# Patient Record
Sex: MALE | Race: WHITE | Employment: FULL TIME | URBAN - METROPOLITAN AREA
[De-identification: names, ages, dates, MRNs, and addresses within clinical notes are randomized per-mention and may not be internally consistent; named-entity substitution may affect disease eponyms.]

---

## 2017-10-23 ENCOUNTER — OFFICE VISIT (OUTPATIENT)
Dept: OTHER | Facility: HOSPITAL | Age: 32
End: 2017-10-23
Attending: PREVENTIVE MEDICINE

## 2017-10-23 ENCOUNTER — HOSPITAL ENCOUNTER (OUTPATIENT)
Dept: GENERAL RADIOLOGY | Facility: HOSPITAL | Age: 32
Discharge: HOME | End: 2017-10-23
Attending: PREVENTIVE MEDICINE

## 2017-10-23 DIAGNOSIS — Z00.00 ANNUAL PHYSICAL EXAM: ICD-10-CM

## 2017-10-23 DIAGNOSIS — Z00.00 ANNUAL PHYSICAL EXAM: Primary | ICD-10-CM

## 2017-10-23 PROCEDURE — 81003 URINALYSIS AUTO W/O SCOPE: CPT

## 2017-10-23 PROCEDURE — 85025 COMPLETE CBC W/AUTO DIFF WBC: CPT

## 2017-10-23 PROCEDURE — 80053 COMPREHEN METABOLIC PANEL: CPT

## 2017-10-23 PROCEDURE — 80061 LIPID PANEL: CPT

## 2018-07-25 ENCOUNTER — OFFICE VISIT (OUTPATIENT)
Dept: OTHER | Facility: HOSPITAL | Age: 33
End: 2018-07-25
Attending: PREVENTIVE MEDICINE
Payer: OTHER MISCELLANEOUS

## 2018-07-25 DIAGNOSIS — M79.604 LUMBAR PAIN WITH RADIATION DOWN RIGHT LEG: Primary | ICD-10-CM

## 2018-07-25 DIAGNOSIS — M54.50 LUMBAR PAIN WITH RADIATION DOWN RIGHT LEG: Primary | ICD-10-CM

## 2018-07-25 RX ORDER — CYCLOBENZAPRINE HCL 10 MG
TABLET ORAL
Qty: 10 TABLET | Refills: 0 | Status: SHIPPED | OUTPATIENT
Start: 2018-07-25 | End: 2021-02-17

## 2018-07-30 ENCOUNTER — APPOINTMENT (OUTPATIENT)
Dept: OTHER | Facility: HOSPITAL | Age: 33
End: 2018-07-30
Attending: PREVENTIVE MEDICINE
Payer: OTHER MISCELLANEOUS

## 2018-08-08 ENCOUNTER — OFFICE VISIT (OUTPATIENT)
Dept: OTHER | Facility: HOSPITAL | Age: 33
End: 2018-08-08
Attending: PREVENTIVE MEDICINE

## 2018-08-08 DIAGNOSIS — M54.50 LUMBAR PAIN: Primary | ICD-10-CM

## 2018-08-08 RX ORDER — CYCLOBENZAPRINE HCL 10 MG
TABLET ORAL
Qty: 10 TABLET | Refills: 0 | Status: SHIPPED | OUTPATIENT
Start: 2018-08-08 | End: 2021-02-17

## 2018-08-13 ENCOUNTER — APPOINTMENT (OUTPATIENT)
Dept: PHYSICAL THERAPY | Facility: HOSPITAL | Age: 33
End: 2018-08-13
Attending: PREVENTIVE MEDICINE
Payer: OTHER MISCELLANEOUS

## 2018-08-15 ENCOUNTER — HOSPITAL ENCOUNTER (OUTPATIENT)
Dept: PHYSICAL THERAPY | Facility: HOSPITAL | Age: 33
Setting detail: THERAPIES SERIES
Discharge: HOME | End: 2018-08-15
Attending: PREVENTIVE MEDICINE
Payer: OTHER MISCELLANEOUS

## 2018-08-15 PROCEDURE — 97110 THERAPEUTIC EXERCISES: CPT

## 2018-08-15 PROCEDURE — 97162 PT EVAL MOD COMPLEX 30 MIN: CPT

## 2018-08-15 NOTE — PROGRESS NOTES
SPINE EVALUATION:   Referring Physician: Dr. Jen Jordan  Diagnosis: Lumbar pain strain.      Date of Service: 8/15/2018     PATIENT SUMMARY   Mikayla Nelson is a 34year old y/o male who presents to therapy today with complaints of noting the onset None  OBJECTIVE:   Observation/Posture: tends to hyperextend his knees when standing, elevated right iliac crest, decreased lumbar lordosis. Sit to/from standing:  Without UE assist, but notes 1-2/10 low back pain.    Gait: decreased stride lengths, toes UE assist and without pain. Pt will be able to walk at least 4 blocks without back pain. Pt will report being able to stand for at least 30 minutes without pain with his ADLs, etc...   Pt will demonstrated neutral spine posture with bending and when lift

## 2018-08-20 ENCOUNTER — HOSPITAL ENCOUNTER (OUTPATIENT)
Dept: PHYSICAL THERAPY | Facility: HOSPITAL | Age: 33
Setting detail: THERAPIES SERIES
Discharge: HOME | End: 2018-08-20
Attending: PREVENTIVE MEDICINE
Payer: OTHER MISCELLANEOUS

## 2018-08-20 PROCEDURE — 97140 MANUAL THERAPY 1/> REGIONS: CPT

## 2018-08-20 PROCEDURE — 97110 THERAPEUTIC EXERCISES: CPT

## 2018-08-20 NOTE — PROGRESS NOTES
Dx: Lumbar pain strain. Authorized # of Visits:  6         Next MD visit: shelbi. .. Fall Risk: standard         Precautions: n/a             Subjective:  Patient rates mildly sharp right low back pain 1/10 this morning.     Denies any LE radicular sy repsl..       Sit on SB trunk rotation 15# x 10 to the left and to the right. Standing biceps 9# x 10 B. .. Standing PUP with a SB on the wall x 20 reps. .. Skilled Services: education, manual therapy, appropriate exercise progression.

## 2018-08-22 ENCOUNTER — APPOINTMENT (OUTPATIENT)
Dept: OTHER | Facility: HOSPITAL | Age: 33
End: 2018-08-22
Attending: PREVENTIVE MEDICINE
Payer: OTHER MISCELLANEOUS

## 2018-08-22 ENCOUNTER — HOSPITAL ENCOUNTER (OUTPATIENT)
Dept: PHYSICAL THERAPY | Facility: HOSPITAL | Age: 33
Setting detail: THERAPIES SERIES
Discharge: HOME | End: 2018-08-22
Attending: PREVENTIVE MEDICINE
Payer: OTHER MISCELLANEOUS

## 2018-08-22 PROCEDURE — 97110 THERAPEUTIC EXERCISES: CPT

## 2018-08-22 PROCEDURE — 97140 MANUAL THERAPY 1/> REGIONS: CPT

## 2018-08-22 NOTE — PROGRESS NOTES
Dx: Lumbar pain strain. Authorized # of Visits:  6         Next MD visit: shelbi. .. Fall Risk: standard         Precautions: n/a             Subjective:  Patient rates right-sided low back pain 0/10 when sitting.     Patient rates mildly sharp right Date:               TX#: 5/ Date:               TX#: 6/   TM at 2.4 mph for 6 minutes. TM 2.7 mph for 6 minutes. Supine hip PROM by PT. Supine hip PROM by PT. Prone hip IR grade 3 mobs by PT each leg.   Grade 3 prone hip IR mobs left and righ

## 2018-08-29 ENCOUNTER — HOSPITAL ENCOUNTER (OUTPATIENT)
Dept: PHYSICAL THERAPY | Facility: HOSPITAL | Age: 33
Setting detail: THERAPIES SERIES
Discharge: HOME | End: 2018-08-29
Attending: PREVENTIVE MEDICINE
Payer: OTHER MISCELLANEOUS

## 2018-08-29 PROCEDURE — 97140 MANUAL THERAPY 1/> REGIONS: CPT

## 2018-08-29 PROCEDURE — 97110 THERAPEUTIC EXERCISES: CPT

## 2018-08-29 NOTE — PROGRESS NOTES
Dx: Lumbar pain strain. Authorized # of Visits:  6         Next MD visit: shelbi. .. Fall Risk: standard         Precautions: n/a             Subjective:  Patient rates right-sided low back pain 0/10 when sitting.     Patient rates low back pain 0/10 without UE assist and without pain. Pt will be able to walk at least 4 blocks without back pain. Pt will report being able to stand for at least 30 minutes without pain with his ADLs, etc...   Pt will demonstrated neutral spine posture with bending and w with cueing. Sit on SB trunk rotation 15# x 15 to the left and to the right. Side lying thoracic rotation x 12 each side. X 15 reps. [place] Master BOSU step ups 2x10 ea with high knee, minimal UE support     6\" dips x 10 each leg. 6\" dips x 25 each leg.   Stand

## 2018-08-31 ENCOUNTER — HOSPITAL ENCOUNTER (OUTPATIENT)
Dept: PHYSICAL THERAPY | Facility: HOSPITAL | Age: 33
Setting detail: THERAPIES SERIES
Discharge: HOME | End: 2018-08-31
Attending: PREVENTIVE MEDICINE
Payer: OTHER MISCELLANEOUS

## 2018-08-31 PROCEDURE — 97110 THERAPEUTIC EXERCISES: CPT

## 2018-08-31 NOTE — PROGRESS NOTES
Dx: Lumbar pain strain. Authorized # of Visits:  6         Next MD visit: tamrad. .. Fall Risk: standard         Precautions: n/a              Progress Summary    Pt has attended 5, cancelled 0, and no shown 0 visits in Physical Therapy.      Subjecti evaluation): 56/100  FOTO (re-evaluation): 61/100    Goals: To be achieved over 6 PT sessions:    Sit to/from standing without UE assist and without pain. MET  Pt will be able to walk at least 4 blocks without back pain.   PROGRESSING  Pt will report being mobs left and right. Lower lumbar rotation unilateral  to improve flexibility, 2x10 sec Prone press ups 2x10 to improve lumbar mobility    DKTC with SB x 20 reps. . X 20 reps. Deepa Cedillow  LTR without swiss ball x20 Bridging 2 x 15 with calves on SB 3 sec hold    LTR the left and to the right. Hamstring stretching 2x30 sec ea to improve flexibility X    Standing biceps 9# x 10 B. .. Stride standing biceps curls 9# x 10 reps with each foot forward. X X    Standing PUP with a SB on the wall x 20 reps. ..  X 20 reps X X

## 2018-09-04 ENCOUNTER — APPOINTMENT (OUTPATIENT)
Dept: OTHER | Facility: HOSPITAL | Age: 33
End: 2018-09-04
Attending: PREVENTIVE MEDICINE
Payer: OTHER MISCELLANEOUS

## 2018-09-05 ENCOUNTER — HOSPITAL ENCOUNTER (OUTPATIENT)
Dept: PHYSICAL THERAPY | Facility: HOSPITAL | Age: 33
Setting detail: THERAPIES SERIES
Discharge: HOME | End: 2018-09-05
Attending: PREVENTIVE MEDICINE
Payer: OTHER MISCELLANEOUS

## 2018-09-05 PROCEDURE — 97110 THERAPEUTIC EXERCISES: CPT

## 2018-09-05 NOTE — PROGRESS NOTES
Dx: Lumbar pain strain. Authorized # of Visits:  6         Next MD visit: shelbi. .. Fall Risk: standard         Precautions: n/a                Subjective: Patient rates right-sided low back pain 0/10 when sitting.     He notes that he followed up wi grade III to improve mobility Lower lumbar rotation stretching 20x to improve  Side lying lumbar rotation grade 3 for 2-3 minutes. Supine hip PROM by PT. Supine hip PROM by PT. Supine hip PROM by PT.  Single knee to chest 10 sec hold, 5x ea to impr rotation while holding 6# medicine ball, 10x -       Side lying thoracic rotation x 12 each side. X 15 reps. [place] Claude BOSU step ups 2x10 ea with high knee, minimal UE support Reassessment of strength/ROM/FOTO TM 2.8 mph for 5 minutes.         6\" dips x 10 each le

## 2018-09-07 ENCOUNTER — HOSPITAL ENCOUNTER (OUTPATIENT)
Dept: PHYSICAL THERAPY | Facility: HOSPITAL | Age: 33
Setting detail: THERAPIES SERIES
Discharge: HOME | End: 2018-09-07
Attending: PREVENTIVE MEDICINE
Payer: OTHER MISCELLANEOUS

## 2018-09-07 PROCEDURE — 97110 THERAPEUTIC EXERCISES: CPT

## 2018-09-07 NOTE — PROGRESS NOTES
Dx: Lumbar pain strain. Authorized # of Visits:  6         Next MD visit: shelbi. .. Fall Risk: standard         Precautions: n/a                Subjective: feeling Ok, R LB a little achy this morning.  No pain medications    Objective:  See treatment to improve flexibility, 2x10 sec Prone press ups 2x10 to improve lumbar mobility 2 x 10.  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 . X 20 reps. Edna Galea  LTR without swiss ball x20 Bridg left and to the right. Standing on BOSU (flat side) with gentle trunk rotation while holding 6# medicine ball, 10x - --      Side lying thoracic rotation x 12 each side. X 15 reps. Barnetta Netter  BOSU step ups 2x10 ea with high knee, minimal UE support Reassessment of

## 2018-09-10 ENCOUNTER — APPOINTMENT (OUTPATIENT)
Dept: PHYSICAL THERAPY | Facility: HOSPITAL | Age: 33
End: 2018-09-10
Attending: PREVENTIVE MEDICINE
Payer: OTHER MISCELLANEOUS

## 2018-09-12 ENCOUNTER — APPOINTMENT (OUTPATIENT)
Dept: PHYSICAL THERAPY | Facility: HOSPITAL | Age: 33
End: 2018-09-12
Attending: PREVENTIVE MEDICINE
Payer: OTHER MISCELLANEOUS

## 2018-09-12 ENCOUNTER — HOSPITAL ENCOUNTER (OUTPATIENT)
Dept: PHYSICAL THERAPY | Facility: HOSPITAL | Age: 33
Setting detail: THERAPIES SERIES
Discharge: HOME | End: 2018-09-12
Attending: PREVENTIVE MEDICINE
Payer: OTHER MISCELLANEOUS

## 2018-09-12 PROCEDURE — 97110 THERAPEUTIC EXERCISES: CPT

## 2018-09-12 NOTE — PROGRESS NOTES
Dx: Lumbar pain strain. Authorized # of Visits:  6         Next MD visit: shelbi. .. Fall Risk: standard         Precautions: n/a            Discharge Summary    Pt has attended 5 visits in Physical Therapy.      Subjective: feeling Ok, No R LB pain t pain.  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 Pt goals include preventative for further injury.   Past medical history was reviewed with -FABERs and FADIRs tests. 7\" LSU x 5 each leg; low back pain reported with right step-ups, no pain with left step-ups. Balance: SLS R 10 seconds, wobbly with low back pain noted, L 20 seconds no pain.      Today’s Treatment and Response:  Patient Goals: To be achieved over 6 PT sessions:    Sit to/from standing without UE assist and without pain. -MET  Pt will be able to walk at least 4 blocks without back pain. -MET   Pt will report being able to stand for at least 30 minutes without pain wit DKTC with SB x 20 reps. . X 20 reps. Slater August LTR without swiss ball x20 Bridging 2 x 15 with calves on SB 3 sec hold Bridging with calves on SB 3 second holds 2 x 15.    Bridge on SB x 10 w/o UE support  Bridge w/ recip leg raise and neutral pelvis x 10 x 2 w/ f while holding 6# medicine ball, 10x - --      Side lying thoracic rotation x 12 each side. X 15 reps. Eddie DICKSON step ups 2x10 ea with high knee, minimal UE support Reassessment of strength/ROM/FOTO TM 2.8 mph for 5 minutes.   See above      6\" dips x 10 each

## 2018-09-14 ENCOUNTER — APPOINTMENT (OUTPATIENT)
Dept: OTHER | Facility: HOSPITAL | Age: 33
End: 2018-09-14
Attending: FAMILY MEDICINE
Payer: OTHER MISCELLANEOUS

## 2018-10-04 ENCOUNTER — HOSPITAL ENCOUNTER (OUTPATIENT)
Dept: GENERAL RADIOLOGY | Age: 33
Discharge: HOME | End: 2018-10-04
Attending: PHYSICAL MEDICINE & REHABILITATION
Payer: OTHER MISCELLANEOUS

## 2018-10-04 DIAGNOSIS — S39.012A STRAIN OF LUMBAR REGION, INITIAL ENCOUNTER: ICD-10-CM

## 2018-10-04 PROCEDURE — 72100 X-RAY EXAM L-S SPINE 2/3 VWS: CPT | Performed by: PHYSICAL MEDICINE & REHABILITATION

## 2021-02-17 ENCOUNTER — APPOINTMENT (OUTPATIENT)
Dept: OTHER | Facility: HOSPITAL | Age: 36
End: 2021-02-17
Attending: PREVENTIVE MEDICINE

## 2021-02-17 ENCOUNTER — HOSPITAL ENCOUNTER (EMERGENCY)
Facility: HOSPITAL | Age: 36
Discharge: HOME | End: 2021-02-17
Attending: EMERGENCY MEDICINE
Payer: OTHER MISCELLANEOUS

## 2021-02-17 VITALS
WEIGHT: 190 LBS | HEART RATE: 68 BPM | TEMPERATURE: 98 F | DIASTOLIC BLOOD PRESSURE: 75 MMHG | OXYGEN SATURATION: 97 % | HEIGHT: 73 IN | RESPIRATION RATE: 18 BRPM | BODY MASS INDEX: 25.18 KG/M2 | SYSTOLIC BLOOD PRESSURE: 132 MMHG

## 2021-02-17 DIAGNOSIS — T33.90XA FROSTBITE, INITIAL ENCOUNTER: Primary | ICD-10-CM

## 2021-02-17 PROCEDURE — 90471 IMMUNIZATION ADMIN: CPT | Performed by: EMERGENCY MEDICINE

## 2021-02-17 PROCEDURE — 99283 EMERGENCY DEPT VISIT LOW MDM: CPT | Performed by: EMERGENCY MEDICINE

## 2021-02-17 NOTE — ED PROVIDER NOTES
Patient Seen in: [facility] Emergency Department      History   Patient presents with:  Exposure to Cold    Stated Complaint: Frostbite both feet, sent from occupational health. HPI/Subjective:   HPI    The patient presents with frostbite.   The area additionally to the medial aspect of the right great toe about the size of a pencil eraser head, no raised blistering and no drainage, capillary refill less than 2 seconds to the tips of the toes on the right foot.   Minor erythema to the distal aspect

## 2021-02-17 NOTE — ED INITIAL ASSESSMENT (HPI)
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 Yesterday, noted end of some of toes were numb and discolored. Went to occupational health and was told had frostbite and referred here.

## 2023-03-30 ENCOUNTER — OFFICE VISIT (OUTPATIENT)
Dept: OTHER | Facility: HOSPITAL | Age: 38
End: 2023-03-30
Attending: PREVENTIVE MEDICINE

## 2023-03-30 ENCOUNTER — HOSPITAL ENCOUNTER (OUTPATIENT)
Dept: GENERAL RADIOLOGY | Facility: HOSPITAL | Age: 38
Discharge: HOME | End: 2023-03-30
Attending: PREVENTIVE MEDICINE

## 2023-03-30 DIAGNOSIS — Z02.1 PRE-EMPLOYMENT EXAMINATION: ICD-10-CM

## 2023-03-30 DIAGNOSIS — Z02.1 PRE-EMPLOYMENT EXAMINATION: Primary | ICD-10-CM

## 2023-03-30 LAB
ATRIAL RATE: 64 BPM
BASOPHILS # BLD AUTO: 0.03 X10(3) UL (ref 0–0.2)
BASOPHILS NFR BLD AUTO: 0.8 %
BILIRUB UR QL STRIP.AUTO: NEGATIVE
CLARITY UR REFRACT.AUTO: CLEAR
COLOR UR AUTO: YELLOW
EOSINOPHIL # BLD AUTO: 0.06 X10(3) UL (ref 0–0.7)
EOSINOPHIL NFR BLD AUTO: 1.7 %
ERYTHROCYTE [DISTWIDTH] IN BLOOD BY AUTOMATED COUNT: 12.4 %
GLUCOSE UR STRIP.AUTO-MCNC: NEGATIVE MG/DL
HCT VFR BLD AUTO: 41.4 %
HGB BLD-MCNC: 14.9 G/DL
IMM GRANULOCYTES # BLD AUTO: 0.01 X10(3) UL (ref 0–1)
IMM GRANULOCYTES NFR BLD: 0.3 %
KETONES UR STRIP.AUTO-MCNC: NEGATIVE MG/DL
LEUKOCYTE ESTERASE UR QL STRIP.AUTO: NEGATIVE
LYMPHOCYTES # BLD AUTO: 1.33 X10(3) UL (ref 1–4)
LYMPHOCYTES NFR BLD AUTO: 37.3 %
MCH RBC QN AUTO: 31.2 PG (ref 26–34)
MCHC RBC AUTO-ENTMCNC: 36 G/DL (ref 31–37)
MCV RBC AUTO: 86.8 FL
MONOCYTES # BLD AUTO: 0.33 X10(3) UL (ref 0.1–1)
MONOCYTES NFR BLD AUTO: 9.2 %
NEUTROPHILS # BLD AUTO: 1.81 X10 (3) UL (ref 1.5–7.7)
NEUTROPHILS # BLD AUTO: 1.81 X10(3) UL (ref 1.5–7.7)
NEUTROPHILS NFR BLD AUTO: 50.7 %
NITRITE UR QL STRIP.AUTO: NEGATIVE
P AXIS: 19 DEGREES
P-R INTERVAL: 124 MS
PH UR STRIP.AUTO: 6 [PH] (ref 5–8)
PLATELET # BLD AUTO: 161 10(3)UL (ref 150–450)
PROT UR STRIP.AUTO-MCNC: NEGATIVE MG/DL
Q-T INTERVAL: 394 MS
QRS DURATION: 90 MS
QTC CALCULATION (BEZET): 406 MS
R AXIS: 43 DEGREES
RBC # BLD AUTO: 4.77 X10(6)UL
RBC UR QL AUTO: NEGATIVE
SP GR UR STRIP.AUTO: 1.02 (ref 1–1.03)
T AXIS: 44 DEGREES
UROBILINOGEN UR STRIP.AUTO-MCNC: <2 MG/DL
VENTRICULAR RATE: 64 BPM
WBC # BLD AUTO: 3.6 X10(3) UL (ref 4–11)

## 2023-03-30 PROCEDURE — 80053 COMPREHEN METABOLIC PANEL: CPT

## 2023-03-30 PROCEDURE — 85025 COMPLETE CBC W/AUTO DIFF WBC: CPT

## 2023-03-30 PROCEDURE — 93005 ELECTROCARDIOGRAM TRACING: CPT

## 2023-03-30 PROCEDURE — 81003 URINALYSIS AUTO W/O SCOPE: CPT

## 2023-03-30 PROCEDURE — 80061 LIPID PANEL: CPT

## 2023-03-31 LAB
ALBUMIN SERPL-MCNC: 4.3 G/DL (ref 3.4–5)
ALP LIVER SERPL-CCNC: 114 U/L
ALT SERPL-CCNC: 46 U/L
AST SERPL-CCNC: 22 U/L (ref 15–37)
BILIRUB SERPL-MCNC: 0.7 MG/DL (ref 0.1–2)
BUN BLD-MCNC: 21 MG/DL (ref 7–18)
CALCIUM BLD-MCNC: 9.4 MG/DL (ref 8.5–10.1)
CHLORIDE SERPL-SCNC: 111 MMOL/L (ref 98–112)
CHOLEST SERPL-MCNC: 192 MG/DL (ref ?–200)
CO2 SERPL-SCNC: 28 MMOL/L (ref 21–32)
CREAT BLD-MCNC: 0.73 MG/DL
GFR SERPLBLD BASED ON 1.73 SQ M-ARVRAT: 119 ML/MIN/1.73M2 (ref 60–?)
GGT SERPL-CCNC: 37 U/L
GLUCOSE BLD-MCNC: 96 MG/DL (ref 70–99)
HDLC SERPL-MCNC: 51 MG/DL (ref 40–59)
IRON SERPL-MCNC: 94 UG/DL
LDH SERPL L TO P-CCNC: 164 U/L
LDLC SERPL CALC-MCNC: 133 MG/DL (ref ?–100)
MAGNESIUM SERPL-MCNC: 2.2 MG/DL (ref 1.6–2.6)
NONHDLC SERPL-MCNC: 141 MG/DL (ref ?–130)
PHOSPHATE SERPL-MCNC: 2.7 MG/DL (ref 2.5–4.9)
POTASSIUM SERPL-SCNC: 3.8 MMOL/L (ref 3.5–5.1)
PROT SERPL-MCNC: 7.5 G/DL (ref 6.4–8.2)
SODIUM SERPL-SCNC: 142 MMOL/L (ref 136–145)
TRIGL SERPL-MCNC: 41 MG/DL (ref 30–149)
URATE SERPL-MCNC: 4.6 MG/DL
VLDLC SERPL CALC-MCNC: 7 MG/DL (ref 0–30)

## 2023-12-28 ENCOUNTER — HOSPITAL ENCOUNTER (OUTPATIENT)
Dept: GENERAL RADIOLOGY | Facility: HOSPITAL | Age: 38
Discharge: HOME | End: 2023-12-28
Attending: PREVENTIVE MEDICINE

## 2023-12-28 ENCOUNTER — OFFICE VISIT (OUTPATIENT)
Dept: OTHER | Facility: HOSPITAL | Age: 38
End: 2023-12-28
Attending: PREVENTIVE MEDICINE

## 2023-12-28 DIAGNOSIS — S46.911A RIGHT SHOULDER STRAIN: Primary | ICD-10-CM

## 2023-12-28 DIAGNOSIS — S46.911A RIGHT SHOULDER STRAIN: ICD-10-CM

## 2023-12-28 PROCEDURE — 73030 X-RAY EXAM OF SHOULDER: CPT | Performed by: PREVENTIVE MEDICINE

## 2024-01-02 ENCOUNTER — APPOINTMENT (OUTPATIENT)
Dept: OTHER | Facility: HOSPITAL | Age: 39
End: 2024-01-02
Attending: PREVENTIVE MEDICINE

## 2024-01-10 ENCOUNTER — OFFICE VISIT (OUTPATIENT)
Dept: OTHER | Facility: HOSPITAL | Age: 39
End: 2024-01-10
Attending: PREVENTIVE MEDICINE

## 2024-01-10 DIAGNOSIS — M25.511 ACUTE PAIN OF RIGHT SHOULDER: Primary | ICD-10-CM

## 2024-01-19 ENCOUNTER — OFFICE VISIT (OUTPATIENT)
Dept: OTHER | Facility: HOSPITAL | Age: 39
End: 2024-01-19
Attending: PREVENTIVE MEDICINE

## 2024-01-19 DIAGNOSIS — S46.911D STRAIN OF RIGHT SHOULDER, SUBSEQUENT ENCOUNTER: Primary | ICD-10-CM

## 2024-01-22 ENCOUNTER — TELEPHONE (OUTPATIENT)
Dept: PHYSICAL THERAPY | Facility: HOSPITAL | Age: 39
End: 2024-01-22

## 2024-01-23 ENCOUNTER — TELEPHONE (OUTPATIENT)
Dept: PHYSICAL THERAPY | Facility: HOSPITAL | Age: 39
End: 2024-01-23

## 2024-01-24 ENCOUNTER — HOSPITAL ENCOUNTER (OUTPATIENT)
Dept: GENERAL RADIOLOGY | Facility: HOSPITAL | Age: 39
Discharge: HOME | End: 2024-01-24
Attending: PREVENTIVE MEDICINE

## 2024-01-24 ENCOUNTER — OFFICE VISIT (OUTPATIENT)
Dept: OTHER | Facility: HOSPITAL | Age: 39
End: 2024-01-24
Attending: PREVENTIVE MEDICINE

## 2024-01-24 ENCOUNTER — TELEPHONE (OUTPATIENT)
Dept: PHYSICAL THERAPY | Facility: HOSPITAL | Age: 39
End: 2024-01-24

## 2024-01-24 DIAGNOSIS — M54.2 NECK PAIN: Primary | ICD-10-CM

## 2024-01-24 DIAGNOSIS — M54.2 NECK PAIN: ICD-10-CM

## 2024-01-24 PROCEDURE — 72050 X-RAY EXAM NECK SPINE 4/5VWS: CPT | Performed by: PREVENTIVE MEDICINE

## 2024-01-25 ENCOUNTER — OFFICE VISIT (OUTPATIENT)
Dept: PHYSICAL THERAPY | Facility: HOSPITAL | Age: 39
End: 2024-01-25
Attending: PREVENTIVE MEDICINE
Payer: OTHER MISCELLANEOUS

## 2024-01-25 DIAGNOSIS — M54.2 NECK PAIN: Primary | ICD-10-CM

## 2024-01-25 PROCEDURE — 97110 THERAPEUTIC EXERCISES: CPT

## 2024-01-25 PROCEDURE — 97140 MANUAL THERAPY 1/> REGIONS: CPT

## 2024-01-25 PROCEDURE — 97161 PT EVAL LOW COMPLEX 20 MIN: CPT

## 2024-01-25 NOTE — PROGRESS NOTES
SPINE EVALUATION:     Diagnosis:   Neck pain (M54.2)   Strain of right shoulder, subsequent encounter (S40.860D)          Comments:  Neck pain with shoulder pain - 2-3 times per week for 2 weeks            Referring Provider: Ac Horn  Date of Evaluation:    1/25/2024    Precautions:  None Next MD visit:   none scheduled  Date of Surgery: n/a     PATIENT SUMMARY   Jluis King is a 38 year old male who presents to therapy today with complaints of R neck pain and top of R shoulder.  Pulling off conduit end of December rainy/muddy weather had conduit on R shoulder, slipped off backward, coiled up and pulled arm. Able to sleep.  December 27th, 2023. Was working full duty but was still having a lot neck and R shoulder pain, Saw MD yesterday and not on lite duty including :no driving, no field work, no OH, no pushing/pulling, </= 10# restrictions. Doing paperwork.  -constantly moving arm.   Pt describes pain level current 5/10, at best 3/10, at worst 8/10.   Current functional limitations include lifting, raising arm up, movement R shoulder and neck-mireya up and down  No numbness/tingling R shoulder.    Jluis describes prior level of function no neck or R shoulder pain. Pt goals include be able to work without pain anymore and perform all duties.  Past medical history was reviewed with Jluis. Significant findings include  has no past medical history on file.   Pt denies diplopia, dysarthria, dysphasia, dizziness, drop attacks, bowel/bladder changes, saddle anesthesia, and RUSS LE N/T.    ASSESSMENT  Jluis presents to physical therapy evaluation with primary c/o neck and R shoulder pain. The results of the objective tests and measures show decreased range of motion neck and shoulder, increase mm tightness, decreased strength, poor posture.  Functional deficits include but are not limited to lifting, raising arm up, movement R shoulder and neck-mireya up and down.  Signs and symptoms are  consistent with diagnosis of Neck pain (M54.2) and Strain of right shoulder, subsequent encounter (S42.096B) Pt and PT discussed evaluation findings, pathology, POC and HEP.  Pt voiced understanding and performs HEP correctly without reported pain. Skilled Physical Therapy is medically necessary to address the above impairments and reach functional goals.     OBJECTIVE:   Observation/Posture: FHP 25 degrees, rounded shoulders  Neuro Screen: intact light touch, symmetrically, no neuro complaints     Cervical AROM: (* denotes performed with pain)  Flexion: 20-pain  Extension: 30-pain  Sidebending: R 25-pinching; L 20-tight R side  Rotation: R 30-stiff; L 35    Shoulder AROM:standing  Flexion  R 110* L wnl  Abduction R 110* L wnl  ER R 40* L 60, supine R 60  IR R T12* L T6, supine R 50    Accessory motion: decreased R lateral glides C4-6, mm guarding w CPA C2-5  Palpation: tenderness and tension R upper/mid trap, scalenes, supraspinatus     Strength: (* denotes performed with pain)  UE/Scapular   Shoulder Flex: R 4-/5*, L 5/5  Shoulder ABD (C5): R 4-/5*, L 5/5  Biceps (C6): R 5/5, L 5/5  Wrist ext (C6): R 5/5, L 5/5  Triceps (C7): R 5/5, L 5/5  Wrist Flex (C7): R 5/5, L 5/5  Digit Flex (C8): R 5/5, L 5/5  Thumb Ext (C8): R 5/5, L 5/5  Interossei (T1): R 5/5, L 5/5    Rhomboids: R 4-/5*, L 5/5  Mid trap: R 4-/5*; L 5/5  Lats: R nt, L nt  Low trap: R 4-/5*; L 5/5         Flexibility:   UE/Scapular   Upper Trap: R mod; L min  Pec Major: R mod; L mod  Scalenes: R mod, L min     Special tests:     Subacromial Pain Syndrome:  Empty Can test: R neg, L neg  External Rotation Resistance: R strong, L strong  Palpable Tenderness Infraspinatus and Supraspinatus: R +, L -    Rotator Cuff Tears:   ER Lag Sign: R neg, L neg  Dropping Sign at 90 Deg ABD and 45 deg ER: R neg, L neg  AC Compression: R neg, L neg  ULTT: neg    Today’s Treatment and Response:   Pt education was provided on exam findings, treatment diagnosis, treatment  plan, expectations, and prognosis. Pt was also provided recommendations for activity modifications, possible soreness after evaluation, modalities as needed [ice/heat], postural corrections, and importance of remaining active  Patient was instructed in and issued a HEP for: scap retraction x10, pendulum x10-for pain, c-spine SB x3 20 sec, cervical rotation x10-w and wo towel, cane-flex x10, flexion up wall x10, abduction sitting w towel x10 BID    MT-STM R upper/mid trap, scalenes, supraspinatus     MHP x5 min      Charges: PT Eval Low Complexity, Demian      Total Timed Treatment: 20 min     Total Treatment Time: 50 min     PLAN OF CARE:    Goals: (to be met in 10 visits)   Pt will improve cervical AROM flexion by 20 degrees to improve tolerance for looking down to tie shoes   Pt will improve cervical AROM extension by 20 degrees to improve tolerance for putting dishes into overhead cabinets   Pt will improve cervical AROM rotation to >20 degrees to improve tolerance for turning head to check blind spot while driving  Pt will improve postural strength (mid/low trap) to 5/5 to promote improved upright posturing and decreased pain with lifting   Pt will improve shoulder flexion AROM to >/=150 degrees to be able to reach into overhead cabinets without pain or restriction   Pt will improve shoulder abduction AROM to >/=150 degrees to improve ability to don deodorant, don/doff shirts, and wash hair   Pt will increase shoulder AROM ER to 90 to be able to reach and fasten seatbelt   Pt will increase shoulder AROM IR to 70 to be able to reach in back pocket, tuck in shirt, and turn steering wheel without pain  Pt will improve shoulder strength throughout to 5/5 to improve function with lifting and reaching   Pt will be independent and compliant with comprehensive HEP to maintain progress achieved in PT      Frequency / Duration: Patient will be seen for 2 x/week or a total of 10 visits over a 90 day period. Treatment will  include: Manual Therapy, Neuromuscular Re-education, Therapeutic Activities, Therapeutic Exercise, and Home Exercise Program instruction    Education or treatment limitation: None  Rehab Potential:good    Neck Disability Index Score  No data recorded    Patient/Family/Caregiver was advised of these findings, precautions, and treatment options and has agreed to actively participate in planning and for this course of care.    Thank you for your referral. Please co-sign or sign and return this letter via fax as soon as possible to [phone]. If you have any questions, please contact me at Dept: [phone]    Sincerely,  Electronically signed by therapist: Ashley Power, PT    Physician's certification required: Yes  I certify the need for these services furnished under this plan of treatment and while under my care.    X___________________________________________________ Date____________________    Certification From: 1/25/2024  To:4/24/2024

## 2024-01-29 ENCOUNTER — OFFICE VISIT (OUTPATIENT)
Dept: PHYSICAL THERAPY | Facility: HOSPITAL | Age: 39
End: 2024-01-29
Attending: PREVENTIVE MEDICINE
Payer: OTHER MISCELLANEOUS

## 2024-01-29 PROCEDURE — 97140 MANUAL THERAPY 1/> REGIONS: CPT

## 2024-01-29 PROCEDURE — 97110 THERAPEUTIC EXERCISES: CPT

## 2024-01-29 NOTE — PROGRESS NOTES
Diagnosis:   Neck pain (M54.2)   Strain of right shoulder, subsequent encounter (S41.709U)          Comments:  Neck pain with shoulder pain - 2-3 times per week for 2 weeks       Referring Provider: Ac Horn  Date of Evaluation:    1/25/2024    Precautions:  None      Neck Disability Index Score  No data recorded Next MD visit:   none scheduled  Date of Surgery: n/a   Insurance Primary/Secondary: WORKERS COMP / N/A     # Auth Visits: 12            Subjective:   Work sent Jluis home last Thursday since there was no lite duty work available.   Felt better after last PT treatment.   Planning on seeing ortho MD next Monday.    Pain: dull ache/10      Objective: Tx flow sheet     Pt brought MRI results to have PT look at.-\"tiny\" tear R supraspinatus, mild R AC OA    Observation/Posture: FHP 25 degrees, rounded shoulders, muscle guarding R Shoulder      Cervical AROM: (* denotes performed with pain)  Flexion: 20-pain  Extension: 30-pain  Sidebending: R 25-pinching; L 20-tight R side  Rotation: R 30-stiff; L 35    Shoulder AROM:standing  Flexion  R 110* L wnl  Abduction R 110* L wnl  ER R 40* L 60, supine R 60  IR R T12* L T6, supine R 50    Accessory motion: decreased R lateral glides C4-6, mm guarding w CPA C2-5  Palpation: tenderness and tension R upper/mid trap, scalenes, supraspinatus     Strength: (* denotes performed with pain)  UE/Scapular   Shoulder Flex: R 4-/5*, L 5/5  Shoulder ABD (C5): R 4-/5*, L 5/5  Biceps (C6): R 5/5, L 5/5  Wrist ext (C6): R 5/5, L 5/5  Triceps (C7): R 5/5, L 5/5  Wrist Flex (C7): R 5/5, L 5/5  Digit Flex (C8): R 5/5, L 5/5  Thumb Ext (C8): R 5/5, L 5/5  Interossei (T1): R 5/5, L 5/5    Rhomboids: R 4-/5*, L 5/5  Mid trap: R 4-/5*; L 5/5  Lats: R nt, L nt  Low trap: R 4-/5*; L 5/5             Assessment:   Pt reported resolution of dull ache R upper trap.   Observed normal arm swing end of session.  Pt tolerated progression to strengthening and stretching exs without  difficulty.  Manual and verbal cueing to correct R scapular elevation with R shoulder flexion which pt started to self correct throughout session.      Goals:   Goals: (to be met in 10 visits) -progressing  Pt will improve cervical AROM flexion by 20 degrees to improve tolerance for looking down to tie shoes   Pt will improve cervical AROM extension by 20 degrees to improve tolerance for putting dishes into overhead cabinets   Pt will improve cervical AROM rotation to >20 degrees to improve tolerance for turning head to check blind spot while driving  Pt will improve postural strength (mid/low trap) to 5/5 to promote improved upright posturing and decreased pain with lifting   Pt will improve shoulder flexion AROM to >/=150 degrees to be able to reach into overhead cabinets without pain or restriction   Pt will improve shoulder abduction AROM to >/=150 degrees to improve ability to don deodorant, don/doff shirts, and wash hair   Pt will increase shoulder AROM ER to 90 to be able to reach and fasten seatbelt   Pt will increase shoulder AROM IR to 70 to be able to reach in back pocket, tuck in shirt, and turn steering wheel without pain  Pt will improve shoulder strength throughout to 5/5 to improve function with lifting and reaching   Pt will be independent and compliant with comprehensive HEP to maintain progress achieved in PT      Plan: Continue plan of care as pt tolerates with focus on neck rehab.   Date: 1/29/2024  TX#: 2/10 Date:                 TX#: 3/ Date:                 TX#: 4/ Date:                 TX#: 5/ Date:   Tx#: 6/   Ther Ex:     Subj assessment      Scifit F/B 3 min ea L1    Chin tuck x10    Scap retraction + depression red spri 2x10    TRX shoulder stretch wall out 5 sec hold x10 w scap retraction and depression    Roll ball up wall x10    R Upper trap stretch -  20 sec x3    Posture ed       Doorway stretch 20 sec x2      Manual therapy:   R upper trap, cervical paraspinals, levator scapula,  scalenes    GIASTM upper trap, cervical paraspinals, levator scapula, scalenes       MHP R upper trap/cervical 5 min              HEP: scap retraction x10, pendulum x10-for pain, c-spine SB x3 20 sec, cervical rotation x10-w and wo towel, cane-flex x10, flexion up wall x10, abduction sitting w towel x10 BID    Charges: Tex2 25', MT 15' P-n/c 5 min       Total Timed Treatment: 40 min  Total Treatment Time: 45 min

## 2024-01-31 ENCOUNTER — OFFICE VISIT (OUTPATIENT)
Dept: PHYSICAL THERAPY | Facility: HOSPITAL | Age: 39
End: 2024-01-31
Attending: PREVENTIVE MEDICINE
Payer: OTHER MISCELLANEOUS

## 2024-01-31 PROCEDURE — 97110 THERAPEUTIC EXERCISES: CPT

## 2024-01-31 PROCEDURE — 97140 MANUAL THERAPY 1/> REGIONS: CPT

## 2024-01-31 NOTE — PROGRESS NOTES
Diagnosis:   Neck pain (M54.2)   Strain of right shoulder, subsequent encounter (S40.140U)          Comments:  Neck pain with shoulder pain - 2-3 times per week for 2 weeks       Referring Provider: Ac Horn  Date of Evaluation:    1/25/2024    Precautions:  None      Neck Disability Index Score  No data recorded Next MD visit:   none scheduled  Date of Surgery: n/a   Insurance Primary/Secondary: WORKERS COMP / N/A     # Auth Visits: 12            Subjective:   Work sent Jluis home last Thursday since there was no lite duty work available.   Felt better after last PT treatment.   More aware of trying to not hold R arm close to side.  Neck and R upper trap are still sore.    Pain: dull ache, 1/10      Objective: Tx flow sheet     Cervical AROM: (* denotes performed with pain)  Flexion: 30 (was:20-pain)  Extension: 40 (was:30-pain)  Sidebending: R 25-pinching; L 25 (was:20-tight R side)  Rotation: B 40 (Was:R 30-stiff; L 35)    Shoulder AROM:standing  Flexion  R 150* (was:110*) L wnl  Abduction R 150* (was:110*) L wnl  ER R 50* (was:40*) L 60, supine R 60  IR R T10* (was:T12*) L T6, supine R 50    Observation/Posture: FHP 15 degrees (was:25 degrees), 10 degrees upper cervical extensionrounded shoulders, MIN muscle guarding R Shoulder    Accessory motion: decreased R lateral glides C4-6, mm guarding w CPA C2-5  Palpation: tenderness and tension R upper/mid trap, scalenes, NO pain supraspinatus     Strength: (* denotes performed with pain)  UE/Scapular   Shoulder Flex: R 4-/5*, L 5/5  Shoulder ABD (C5): R 4-/5*, L 5/5  Biceps (C6): R 5/5, L 5/5  Wrist ext (C6): R 5/5, L 5/5  Triceps (C7): R 5/5, L 5/5  Wrist Flex (C7): R 5/5, L 5/5  Digit Flex (C8): R 5/5, L 5/5  Thumb Ext (C8): R 5/5, L 5/5  Interossei (T1): R 5/5, L 5/5    Rhomboids: R 4-/5*, L 5/5  Mid trap: R 4-/5*; L 5/5  Lats: R nt, L nt  Low trap: R 4-/5*; L 5/5             Assessment:   Pt reported resolution of dull ache R upper trap.   Observed  normal arm swing end of session.  Pt tolerated progression to strengthening and stretching exs without difficulty.  Manual and verbal cueing to correct R scapular elevation with R shoulder flexion which pt started to self correct throughout session.  Improved shoulder and cervical range of motion as noted above.       Goals:   Goals: (to be met in 10 visits) -progressing  Pt will improve cervical AROM flexion by 20 degrees to improve tolerance for looking down to tie shoes   Pt will improve cervical AROM extension by 20 degrees to improve tolerance for putting dishes into overhead cabinets   Pt will improve cervical AROM rotation to >20 degrees to improve tolerance for turning head to check blind spot while driving  Pt will improve postural strength (mid/low trap) to 5/5 to promote improved upright posturing and decreased pain with lifting   Pt will improve shoulder flexion AROM to >/=150 degrees to be able to reach into overhead cabinets without pain or restriction   Pt will improve shoulder abduction AROM to >/=150 degrees to improve ability to don deodorant, don/doff shirts, and wash hair   Pt will increase shoulder AROM ER to 90 to be able to reach and fasten seatbelt   Pt will increase shoulder AROM IR to 70 to be able to reach in back pocket, tuck in shirt, and turn steering wheel without pain  Pt will improve shoulder strength throughout to 5/5 to improve function with lifting and reaching   Pt will be independent and compliant with comprehensive HEP to maintain progress achieved in PT      Plan: Continue plan of care as pt tolerates with focus on neck rehab. Next visit: lite strengthening  Date: 1/29/2024  TX#: 2/10 Date:    1/31/2024              TX#: 3/ Date:                 TX#: 4/ Date:                 TX#: 5/ Date:   Tx#: 6/   Ther Ex:     Subj assessment      Scifit F/B 3 min ea L1    Chin tuck x10    Scap retraction + depression red spri 2x10    TRX shoulder stretch wall out 5 sec hold x10 w scap  retraction and depression    Roll ball up wall x10    R Upper trap stretch -  20 sec x3    Posture ed Ther Ex:     Subj assessment      Scifit F/B 3 min ea L1 hills    Pulleys x30    Chin tuck x10    Scap retraction + depression red spri 2x10    B shoulder ext red spri 2x10      Doorway stretch 20 sec x2    TRX shoulder stretch wall out 5 sec hold x10 w scap retraction and depression    Roll ball up wall x10 w chin tuck    R Upper trap stretch -  20 sec x3    Posture ed      Manual therapy:   R upper trap, cervical paraspinals, levator scapula, scalenes    GIASTM upper trap, cervical paraspinals, levator scapula, scalenes Manual therapy:   R upper trap, cervical paraspinals, levator scapula, scalenes    GIASTM upper trap, cervical paraspinals, levator scapula, scalenes      MHP R upper trap/cervical 5 min MHP R upper trap/cervical 5 min             HEP: scap retraction x10, pendulum x10-for pain, c-spine SB x3 20 sec, cervical rotation x10-w and wo towel, cane-flex x10, flexion up wall x10, abduction sitting w towel x10 BID    Charges: Tex2 25', MT 15' [facility]-n/c 5 min       Total Timed Treatment: 40 min  Total Treatment Time: 45 min

## 2024-02-05 ENCOUNTER — OFFICE VISIT (OUTPATIENT)
Dept: ORTHOPEDICS CLINIC | Facility: CLINIC | Age: 39
End: 2024-02-05
Payer: OTHER MISCELLANEOUS

## 2024-02-05 VITALS — WEIGHT: 220 LBS | HEIGHT: 73 IN | BODY MASS INDEX: 29.16 KG/M2

## 2024-02-05 DIAGNOSIS — M19.019 AC JOINT ARTHROPATHY: Primary | ICD-10-CM

## 2024-02-05 DIAGNOSIS — S40.011A CONTUSION OF RIGHT SHOULDER, INITIAL ENCOUNTER: ICD-10-CM

## 2024-02-05 RX ORDER — MELOXICAM 15 MG/1
15 TABLET ORAL DAILY
Qty: 14 TABLET | Refills: 1 | Status: SHIPPED | OUTPATIENT
Start: 2024-02-05

## 2024-02-05 NOTE — H&P
Orthopaedic Surgery  [address] [place] [id number]  [phone]     NEW PATIENT VISIT - HISTORY AND PHYSICAL EXAMINATION     Name: Jluis King   MRN: [id number]  Date: 2/5/2024     CC: Right shoulder pain    HPI:   Jluis King is a very pleasant 38 year old right-hand dominant male who presents today for evaluation of right shoulder pain ongoing since [id number] after hyperextending the arm while lifting a heavy object at work. Since then, he reports the pain has improved but there is still a persistent dull ache. Pain is up to 1/10 with stiffness. Pain improves with rest and worsens with activity. He has engaged in physical therapy with mild clinical improvement.  He has completed approximately 6 sessions.    He enjoys gardening. Works as a .    PMH:   No past medical history on file.    PAST SURGICAL HX:  No past surgical history on file.    FAMILY HX:  No family history on file.    ALLERGIES:  Patient has no known allergies.    MEDICATIONS:   No current outpatient medications on file.       ROS: A comprehensive 14 point review of systems was performed and was negative aside from the aforementioned per history of present illness.    SOCIAL HX:  Social History     Tobacco Use    Smoking status: Never    Smokeless tobacco: Never   Substance Use Topics    Alcohol use: Not on file       PE:   There were no vitals filed for this visit.  Estimated body mass index is 25.07 kg/m² as calculated from the following:    Height as of 2/17/21: 6' 1\" (1.854 m).    Weight as of 2/17/21: 190 lb.    Physical Exam  Constitutional:       Appearance: Normal appearance.   HENT:      Head: Normocephalic and atraumatic.   Eyes:      Extraocular Movements: Extraocular movements intact.   Neck:      Musculoskeletal: Normal range of motion and neck supple.   Cardiovascular:      Pulses: Normal pulses.   Pulmonary:      Effort: Pulmonary effort is normal. No respiratory distress.   Abdominal:       General: There is no distension.   Skin:     General: Skin is warm.      Capillary Refill: Capillary refill takes less than 2 seconds.      Findings: No bruising.   Neurological:      General: No focal deficit present.      Mental Status: Alert.   Psychiatric:         Mood and Affect: Mood normal.     Examination of the right shoulder demonstrates:   Skin is intact, warm and dry.   Cervical:  Full ROM  Spurling's  Negative    Deformity:   none  Atrophy:   none    Scapular winging: Negative    Palpation:     AC Joint   Negative  Biceps Tendon  Positive  Greater Tuberosity Negative    ROM:   Forward Flexion:  150°  Abduction:   full and symmetric  External Rotation:  full and symmetric  Internal Rotation:  full and symmetric    Rotator Cuff Strength:   Supraspinatus:   5/5  Subscapularis:   5/5  Infraspinatus/Teres: 5/5    Provocative Tests:   Lucero:   Negative  Speed's:   Negative  [place]'s:   Negative  Lift-off:    Negative  Apprehension:  Negative  Sulcus Sign:   Negative    Neurovascular Upper Extremity (Bilateral)  Motor:    5/5 EPL, Finger Abduction, , Pinch, Deltoid  Sensation:   intact to light touch median, ulnar, radial and axillary nerve  Circulation:   Normal, 2+ radial pulse    The contralateral upper extremity is without limitation in range of motion or strength, no positive provocative maneuvers.       Radiographic Examination/Diagnostics:    Shoulder XR personally viewed, independently interpreted and radiology report was reviewed.    XR SHOULDER, COMPLETE (MIN 2 VIEWS), RIGHT (CPT=73030)     TECHNIQUE:  Multiple views were obtained.     COMPARISON:  None.     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 Patient has posterior right shoulder pain.           FINDINGS:  No acute fracture or dislocation is seen.  There is normal alignment.  Normal bone mineral  density.  Minimal osteoarthritic changes favored with subchondral cyst formation favored within the humeral head and distal clavicle.  If there is  persistent concern then consider follow-up imaging including MRI.      CONCLUSION:  See above.        LOCATION:  [id number]  Dictated by (CST): Gene Landis MD on 12/28/2023 at 2:43 PM      Finalized by (CST): Gene Landis MD on 12/28/2023 at 2:43 PM        MRI of the right shoulder: mild AC joint arthritis and low grade subscapularis irregularity/tearing.  Intact remainder of rotator cuff tendons.  Type II acromion.    IMPRESSION: Jluis King is a 38 year old Right hand dominant male with right AC joint pre-existing arthropathy and shoulder contusion sustained 12/17/23. Patient is currently engaged in physical therapy.    Fortunately, treatment is amenable to continued physical therapy coupled with Meloxicam.    PLAN:   We had a detailed discussion outlining the etiology, anatomy, pathophysiology, and natural history of patient's findings. Imaging was reviewed in detail and correlated to a 3-dimensional model of the shoulder.     We reviewed the treatment of this disease condition.  Fortunately, treatment is amenable to conservative treatment which we chose to optimize at today's visit.  I recommended the continuation of physical therapy to aid in strengthening, range of motion, functional improvement, and return to baseline activity.      We prescribed Meloxicam 15 mg.    Continue light work duty with restricted use of the right arm.     The patient had the opportunity to ask questions and all questions were answered appropriately.      FOLLOW-UP:   Return to clinic for repeat evaluation in 6-10 weeks with Sincer MICHAEL Kuhn PA-C. No imaging required at next visit.       Angela Alanis MD  Knee, Shoulder, & Elbow Surgery / Sports Medicine Specialist  Orthopaedic Surgery  [address] [place] [id number]   [email or web address]  [email or web address]  t: [phone]   o: [phone]  f: [phone]    This note was dictated using Dragon software.  While it was briefly proofread prior to completion, some grammatical, spelling, and word choice errors due to dictation may still occur.

## 2024-02-06 ENCOUNTER — TELEPHONE (OUTPATIENT)
Dept: PHYSICAL THERAPY | Facility: HOSPITAL | Age: 39
End: 2024-02-06

## 2024-02-06 ENCOUNTER — OFFICE VISIT (OUTPATIENT)
Dept: PHYSICAL THERAPY | Facility: HOSPITAL | Age: 39
End: 2024-02-06
Attending: PREVENTIVE MEDICINE
Payer: OTHER MISCELLANEOUS

## 2024-02-06 PROCEDURE — 97110 THERAPEUTIC EXERCISES: CPT

## 2024-02-06 PROCEDURE — 97140 MANUAL THERAPY 1/> REGIONS: CPT

## 2024-02-06 NOTE — PROGRESS NOTES
Diagnosis:   Neck pain (M54.2)   Strain of right shoulder, subsequent encounter (S47.161S)          Comments:  Neck pain with shoulder pain - 2-3 times per week for 2 weeks       Referring Provider: Ac Horn  Date of Evaluation:    1/25/2024    Precautions:  None      Neck Disability Index Score  No data recorded Next MD visit:   none scheduled  Date of Surgery: n/a   Insurance Primary/Secondary: WORKERS COMP / N/A     # Auth Visits: 12            MRI of the right shoulder: mild AC joint arthritis and low grade subscapularis irregularity/tearing.  Intact remainder of rotator cuff tendons.  Type II acromion.     Subjective:   Saw Dr Alanis yesterday. Pt to continue physical therapy. Pt reports that he was instructed to continue lite duty for 6 more weeks . Pt's employment informed pt that he will continue to be off of work for this amount of time since they cannot fully accommodate work restrictions per MD's orders. Pain with raising arm overhead.  Pt reports that he is feeling much better after coming to physical therapy.    Pain: 1/10 neck and R shoulder      Objective: Tx flow sheet     Cervical AROM: (* denotes performed with pain)  Flexion: 30 (was:20-pain)  Extension: 40 (was:30-pain)  Sidebending: R 25-pinching; L 25 (was:20-tight R side)  Rotation: B 40 (Was:R 30-stiff; L 35)    Shoulder AROM:standing  Flexion  R 120* (was:110*)-beginning of session L wnl  Abduction R 150* (was:110*) L wnl  ER R 50* (was:40*) L 60, supine R 60  IR R T10* (was:T12*) L T6, supine R 50    Observation/Posture: FHP 15 degrees (was:25 degrees), 10 degrees upper cervical extensionrounded shoulders, MIN muscle guarding R Shoulder    Accessory motion: decreased R lateral glides C4-6, mm guarding w CPA C2-5  Palpation: tenderness and tension R upper/mid trap, scalenes, supraspinatus, levator scapula , no tenderness R AC joint    Strength: (* denotes performed with pain)  UE/Scapular   Shoulder Flex: R 4-/5*, L 5/5  Shoulder ABD  (C5): R 4-/5*, L 5/5  Biceps (C6): R 5/5, L 5/5  Wrist ext (C6): R 5/5, L 5/5  Triceps (C7): R 5/5, L 5/5  Wrist Flex (C7): R 5/5, L 5/5  Digit Flex (C8): R 5/5, L 5/5  Thumb Ext (C8): R 5/5, L 5/5  Interossei (T1): R 5/5, L 5/5    Rhomboids: R 4-/5*, L 5/5  Mid trap: R 4-/5*; L 5/5  Lats: R nt, L nt  Low trap: R 4-/5*; L 5/5             Assessment:   Pt reported resolution of dull ache R upper trap after manual therapy.  Improved R shoulder flexion to 150 after Manual therapy: to supraspinatus .   Pt responded well to functional manual release with shoulder PROM which initially was tender but resolved.  Pt tolerated progression to strengthening and stretching exs without difficulty. Included visual feedback with B shoulder flexion and abd w verbal and manual cueing to improve scapulohumeral rhythm w activities of daily living .  Manual and verbal cueing to correct R scapular elevation with R shoulder flexion which pt started to self correct throughout session.    Goals:   Goals: (to be met in 10 visits) -progressing  Pt will improve cervical AROM flexion by 20 degrees to improve tolerance for looking down to tie shoes   Pt will improve cervical AROM extension by 20 degrees to improve tolerance for putting dishes into overhead cabinets   Pt will improve cervical AROM rotation to >20 degrees to improve tolerance for turning head to check blind spot while driving  Pt will improve postural strength (mid/low trap) to 5/5 to promote improved upright posturing and decreased pain with lifting   Pt will improve shoulder flexion AROM to >/=150 degrees to be able to reach into overhead cabinets without pain or restriction   Pt will improve shoulder abduction AROM to >/=150 degrees to improve ability to don deodorant, don/doff shirts, and wash hair   Pt will increase shoulder AROM ER to 90 to be able to reach and fasten seatbelt   Pt will increase shoulder AROM IR to 70 to be able to reach in back pocket, tuck in shirt, and  turn steering wheel without pain  Pt will improve shoulder strength throughout to 5/5 to improve function with lifting and reaching   Pt will be independent and compliant with comprehensive HEP to maintain progress achieved in PT      Plan: Continue plan of care as pt tolerates with focus on neck rehab. Next visit: lite strengthening  Date: 1/29/2024  TX#: 2/10 Date:    1/31/2024              TX#: 3/ Date:        2/6/2024          TX#: 4/ Date:                 TX#: 5/ Date:   Tx#: 6/   Ther Ex:     Subj assessment      Scifit F/B 3 min ea L1    Chin tuck x10    Scap retraction + depression red spri 2x10    TRX shoulder stretch wall out 5 sec hold x10 w scap retraction and depression    Roll ball up wall x10    R Upper trap stretch -  20 sec x3    Posture ed Ther Ex:     Subj assessment      Scifit F/B 3 min ea L1 hills    Pulleys x30    Chin tuck x10    Scap retraction + depression red spri 2x10    B shoulder ext red spri 2x10      Doorway stretch 20 sec x2    TRX shoulder stretch wall out 5 sec hold x10 w scap retraction and depression    Roll ball up wall x10 w chin tuck    R Upper trap stretch -  20 sec x3    Posture ed Ther Ex:     Subj assessment      Scifit F/B 3 min ea L2     Scap retraction + depression red spri 2x10    B shoulder ext red spri 2x10    ER YTB x10  IR YTB x10    Doorway stretch 20 sec x2    Roll ball up wall x20 w chin tuck    R Upper trap stretch -  20 sec x3    Standing in front of mirror-  B shoulder flex x10  B shoulder abduction x10  Manual and verbal cueing    PROM R shoulder w functional manual release R supraspinatus , cervical spine    Posture ed    Pt education: use of tennis/golf ball for ischemic compression w functional manual release for HEP         Manual therapy:   R upper trap, cervical paraspinals, levator scapula, scalenes    GIASTM upper trap, cervical paraspinals, levator scapula, scalenes Manual therapy:   R upper trap, cervical paraspinals, levator scapula,  scalenes    GIASTM upper trap, cervical paraspinals, levator scapula, scalenes Manual therapy:   R upper trap, cervical paraspinals, levator scapula, scalenes, supraspinatus     GIASTM upper trap, cervical paraspinals, levator scapula, scalenes     MHP R upper trap/cervical 5 min MHP R upper trap/cervical 5 min MHP R upper trap/cervical 2 min            HEP: scap retraction x10, pendulum x10-for pain, c-spine SB x3 20 sec, cervical rotation x10-w and wo towel, cane-flex x10, flexion up wall x10, abduction sitting w towel x10 BID    Charges: Tex2 25', MT 15' [facility]-n/c 2 min       Total Timed Treatment: 43 min  Total Treatment Time: 45 min

## 2024-02-08 ENCOUNTER — OFFICE VISIT (OUTPATIENT)
Dept: PHYSICAL THERAPY | Facility: HOSPITAL | Age: 39
End: 2024-02-08
Attending: PREVENTIVE MEDICINE
Payer: OTHER MISCELLANEOUS

## 2024-02-08 PROCEDURE — 97140 MANUAL THERAPY 1/> REGIONS: CPT

## 2024-02-08 PROCEDURE — 97110 THERAPEUTIC EXERCISES: CPT

## 2024-02-08 NOTE — PROGRESS NOTES
Diagnosis:   Neck pain (M54.2)   Strain of right shoulder, subsequent encounter (S45.528B)          Comments:  Neck pain with shoulder pain - 2-3 times per week for 2 weeks       Referring Provider: Ac Horn  Date of Evaluation:    1/25/2024    Precautions:  None      Neck Disability Index Score  No data recorded Next MD visit:   none scheduled  Date of Surgery: n/a   Insurance Primary/Secondary: [employer] / N/A     # Auth Visits: 12            MRI of the right shoulder: mild AC joint arthritis and low grade subscapularis irregularity/tearing.  Intact remainder of rotator cuff tendons.  Type II acromion.     Subjective:   R shoulder feeling a lot better after last treatment. Pt reports that he feels like he can raise his R shoulder higher with less pain.  Pt reports that he is feeling much better after coming to physical therapy.    Pain:  1/2/10 neck and R shoulder      Objective: Tx flow sheet     Cervical AROM: (* denotes performed with pain)  Flexion: 30 (was:20-pain)  Extension: 40 (was:30-pain)  Sidebending: B 30 (was:20-tight R side)  Rotation: B 50 (Was:R 30-stiff; L 35)    Shoulder AROM:standing  Flexion  R 125*(was:110*)-beginning of session L wnl  Abduction R 150* (was:110*) L wnl  ER R 50* (was:40*) L 60, supine R 60  IR R T10* (was:T12*) L T6, supine R 50    Observation/Posture: FHP 15 degrees (was:25 degrees), 10 degrees upper cervical extensionrounded shoulders, MIN muscle guarding R Shoulder    Accessory motion: decreased R lateral glides C4-6, mm guarding w CPA C2-5  Palpation: tenderness and tension R upper/mid trap, scalenes, supraspinatus, levator scapula , no tenderness R AC joint    Strength: (* denotes performed with pain)  UE/Scapular   Shoulder Flex: R 4-/5*, L 5/5  Shoulder ABD (C5): R 4-/5*, L 5/5  Biceps (C6): R 5/5, L 5/5  Wrist ext (C6): R 5/5, L 5/5  Triceps (C7): R 5/5, L 5/5  Wrist Flex (C7): R 5/5, L 5/5  Digit Flex (C8): R 5/5, L 5/5  Thumb Ext (C8): R 5/5, L  5/5  Interossei (T1): R 5/5, L 5/5    Rhomboids: R 4-/5*, L 5/5  Mid trap: R 4-/5*; L 5/5  Lats: R nt, L nt  Low trap: R 4-/5*; L 5/5             Assessment:   Pt reported resolution of dull ache R upper trap after manual therapy.  Improved R shoulder flexion to 150 after Manual therapy: to supraspinatus .   Pt responded well to functional manual release with shoulder PROM which initially was tender but resolved.  Pt tolerated progression to strengthening and stretching exs without difficulty. Included visual feedback with B shoulder flexion and abd w verbal and manual cueing to improve scapulohumeral rhythm w activities of daily living .  Manual and verbal cueing to correct R scapular elevation with R shoulder flexion which pt started to self correct throughout session.  Added: 1/2 foam roll w B UE movement to improve SH rhythm and range of motion . Inc difficulty w resistive band.    Goals:   Goals: (to be met in 10 visits) -progressing  Pt will improve cervical AROM flexion by 20 degrees to improve tolerance for looking down to tie shoes   Pt will improve cervical AROM extension by 20 degrees to improve tolerance for putting dishes into overhead cabinets   Pt will improve cervical AROM rotation to >20 degrees to improve tolerance for turning head to check blind spot while driving  Pt will improve postural strength (mid/low trap) to 5/5 to promote improved upright posturing and decreased pain with lifting   Pt will improve shoulder flexion AROM to >/=150 degrees to be able to reach into overhead cabinets without pain or restriction   Pt will improve shoulder abduction AROM to >/=150 degrees to improve ability to don deodorant, don/doff shirts, and wash hair   Pt will increase shoulder AROM ER to 90 to be able to reach and fasten seatbelt   Pt will increase shoulder AROM IR to 70 to be able to reach in back pocket, tuck in shirt, and turn steering wheel without pain  Pt will improve shoulder strength throughout to  5/5 to improve function with lifting and reaching   Pt will be independent and compliant with comprehensive HEP to maintain progress achieved in PT      Plan: Continue plan of care as pt tolerates with focus on neck rehab. Next visit: lite strengthening, Next visit: test strengthening  Date: 1/29/2024  TX#: 2/10 Date:    1/31/2024              TX#: 3/ Date:        2/6/2024          TX#: 4/ Date:      2/8/2024            TX#: 5/ Date:   Tx#: 6/   Ther Ex:     Subj assessment      Scifit F/B 3 min ea L1    Chin tuck x10    Scap retraction + depression red spri 2x10    TRX shoulder stretch wall out 5 sec hold x10 w scap retraction and depression    Roll ball up wall x10    R Upper trap stretch -  20 sec x3    Posture ed Ther Ex:     Subj assessment      Scifit F/B 3 min ea L1 hills    Pulleys x30    Chin tuck x10    Scap retraction + depression red spri 2x10    B shoulder ext red spri 2x10      Doorway stretch 20 sec x2    TRX shoulder stretch wall out 5 sec hold x10 w scap retraction and depression    Roll ball up wall x10 w chin tuck    R Upper trap stretch -  20 sec x3    Posture ed Ther Ex:     Subj assessment      Scifit F/B 3 min ea L2     Scap retraction + depression red spri 2x10    B shoulder ext red spri 2x10    ER YTB x10  IR YTB x10    Doorway stretch 20 sec x2    Roll ball up wall x20 w chin tuck    R Upper trap stretch -  20 sec x3    Standing in front of mirror-  B shoulder flex x10  B shoulder abduction x10  Manual and verbal cueing    PROM R shoulder w functional manual release R supraspinatus , cervical spine    Posture ed    Pt education: use of tennis/golf ball for ischemic compression w functional manual release for HEP     Ther Ex:     Subj assessment      Scifit F/B 3 min ea L2     Scap retraction + depression red spri 2x10    B shoulder ext red spri 2x10    ER RTB x10  IR RTB x10    Doorway stretch 20 sec x2    Roll ball up wall x20 w chin tuck    R Upper trap stretch -  20 sec x3    Standing in  front of mirror-  B shoulder flex x10  B shoulder abduction x10  Manual and verbal cueing    PROM R shoulder w functional manual release R supraspinatus , cervical spine    Posture ed    1/2 FR-B, alt UE overhead x10    SB, lev scap, rotation c-spine with OP x2 ea        Manual therapy:   R upper trap, cervical paraspinals, levator scapula, scalenes    GIASTM upper trap, cervical paraspinals, levator scapula, scalenes Manual therapy:   R upper trap, cervical paraspinals, levator scapula, scalenes    GIASTM upper trap, cervical paraspinals, levator scapula, scalenes Manual therapy:   R upper trap, cervical paraspinals, levator scapula, scalenes, supraspinatus     GIASTM upper trap, cervical paraspinals, levator scapula, scalenes Manual therapy:   R upper trap, cervical paraspinals, levator scapula, scalenes, supraspinatus     GIASTM upper trap, cervical paraspinals, levator scapula, scalenes    MHP R upper trap/cervical 5 min MHP R upper trap/cervical 5 min MHP R upper trap/cervical 2 min MHP R upper trap/cervical 2 min           HEP: scap retraction x10, pendulum x10-for pain, c-spine SB x3 20 sec, cervical rotation x10-w and wo towel, cane-flex x10, flexion up wall x10, abduction sitting w towel x10 BID    Charges: [place]2 25', MT 15' MHP-n/c 2 min       Total Timed Treatment: 43 min  Total Treatment Time: 45 min

## 2024-02-13 ENCOUNTER — OFFICE VISIT (OUTPATIENT)
Dept: PHYSICAL THERAPY | Facility: HOSPITAL | Age: 39
End: 2024-02-13
Attending: PREVENTIVE MEDICINE
Payer: OTHER MISCELLANEOUS

## 2024-02-13 PROCEDURE — 97110 THERAPEUTIC EXERCISES: CPT

## 2024-02-13 PROCEDURE — 97140 MANUAL THERAPY 1/> REGIONS: CPT

## 2024-02-13 NOTE — PROGRESS NOTES
Diagnosis:   Neck pain (M54.2)   Strain of right shoulder, subsequent encounter (S43.722P)          Comments:  Neck pain with shoulder pain - 2-3 times per week for 2 weeks       Referring Provider: Ac Horn  Date of Evaluation:    1/25/2024    Precautions:  None      Neck Disability Index Score  No data recorded Next MD visit:   none scheduled  Date of Surgery: n/a   Insurance Primary/Secondary: [employer] / N/A     # Auth Visits: 12            MRI of the right shoulder: mild AC joint arthritis and low grade subscapularis irregularity/tearing.  Intact remainder of rotator cuff tendons.  Type II acromion.     Subjective:   R shoulder feeling better. Able to do light ADL's without difficulty.  Pt reports that he is feeling much better after coming to physical therapy.    Pain:  1/10 upper trap      Objective: Tx flow sheet     Cervical AROM: (* denotes performed with pain)  Flexion: 30 (was:20-pain)  Extension: 40 (was:30-pain)  Sidebending: B 30 (was:20-tight R side)  Rotation: B 50 (Was:R 30-stiff; L 35)    Shoulder AROM:standing  Flexion  R 125*(was:110*)-beginning of session L wnl  Abduction R 150* (was:110*) L wnl  ER R 50* (was:40*) L 60, supine R 60  IR R T10* (was:T12*) L T6, supine R 50    Observation/Posture: FHP 15 degrees (was:25 degrees), 10 degrees upper cervical extensionrounded shoulders, MIN muscle guarding R Shoulder    Accessory motion: decreased R lateral glides C4-6, NO mm guarding w CPA C2-5  Palpation: tenderness and tension R upper/mid trap, scalenes, supraspinatus, levator scapula , no tenderness R AC joint    Strength: (* denotes performed with pain)  UE/Scapular   Shoulder Flex: R 4-/5*, L 5/5  Shoulder ABD (C5): R 4-/5*, L 5/5  Biceps (C6): R 5/5, L 5/5  Wrist ext (C6): R 5/5, L 5/5  Triceps (C7): R 5/5, L 5/5  Wrist Flex (C7): R 5/5, L 5/5  Digit Flex (C8): R 5/5, L 5/5  Thumb Ext (C8): R 5/5, L 5/5  Interossei (T1): R 5/5, L 5/5    Rhomboids: R 4-/5*, L 5/5  Mid trap: R 4-/5*;  L 5/5  Lats: R nt, L nt  Low trap: R 4-/5*; L 5/5             Assessment:   Pt reported resolution of dull ache R upper trap after manual therapy.  Improved R shoulder flexion to 150 after Manual therapy: to supraspinatus .   Pt responded well to functional manual release with shoulder PROM which initially was tender but resolved.  Pt tolerated progression to strengthening and stretching exs without difficulty.   Manual and verbal cueing to correct R scapular elevation with R shoulder flexion which pt started to self correct throughout session.  Added: 1/2 foam roll in standing w alt UE movement to improve SH rhythm and range of motion . Inc difficulty w weighted cable pulleys.  Pt appeared to only have intermittent difficulty with standing and performing full shoulder flexion and scaption secondary to R upper trap soreness.  Added more lite strengthening today and will test strength next visit.     Goals:   Goals: (to be met in 10 visits) -progressing  Pt will improve cervical AROM flexion by 20 degrees to improve tolerance for looking down to tie shoes   Pt will improve cervical AROM extension by 20 degrees to improve tolerance for putting dishes into overhead cabinets   Pt will improve cervical AROM rotation to >20 degrees to improve tolerance for turning head to check blind spot while driving  Pt will improve postural strength (mid/low trap) to 5/5 to promote improved upright posturing and decreased pain with lifting   Pt will improve shoulder flexion AROM to >/=150 degrees to be able to reach into overhead cabinets without pain or restriction   Pt will improve shoulder abduction AROM to >/=150 degrees to improve ability to don deodorant, don/doff shirts, and wash hair   Pt will increase shoulder AROM ER to 90 to be able to reach and fasten seatbelt   Pt will increase shoulder AROM IR to 70 to be able to reach in back pocket, tuck in shirt, and turn steering wheel without pain  Pt will improve shoulder strength  throughout to 5/5 to improve function with lifting and reaching   Pt will be independent and compliant with comprehensive HEP to maintain progress achieved in PT      Plan: Continue plan of care as pt tolerates with focus on neck rehab. Next visit: lite strengthening, Next visit: test strengthening and range of motion   Date:    1/31/2024              TX#: 3/ Date:        2/6/2024          TX#: 4/ Date:      2/8/2024            TX#: 5/ Date: 2/13/2024   Tx#: 6/ Date:   Tx#:  Date:   Tx#:    Ther Ex:     Subj assessment      Scifit F/B 3 min ea L1 hills    Pulleys x30    Chin tuck x10    Scap retraction + depression red spri 2x10    B shoulder ext red spri 2x10      Doorway stretch 20 sec x2    TRX shoulder stretch wall out 5 sec hold x10 w scap retraction and depression    Roll ball up wall x10 w chin tuck    R Upper trap stretch -  20 sec x3    Posture ed Ther Ex:     Subj assessment      Scifit F/B 3 min ea L2     Scap retraction + depression red spri 2x10    B shoulder ext red spri 2x10    ER YTB x10  IR YTB x10    Doorway stretch 20 sec x2    Roll ball up wall x20 w chin tuck    R Upper trap stretch -  20 sec x3    Standing in front of mirror-  B shoulder flex x10  B shoulder abduction x10  Manual and verbal cueing    PROM R shoulder w functional manual release R supraspinatus , cervical spine    Posture ed    Pt education: use of tennis/golf ball for ischemic compression w functional manual release for HEP     Ther Ex:     Subj assessment      Scifit F/B 3 min ea L2     Scap retraction + depression red spri 2x10    B shoulder ext red spri 2x10    ER RTB x10  IR RTB x10    Doorway stretch 20 sec x2    Roll ball up wall x20 w chin tuck    R Upper trap stretch -  20 sec x3    Standing in front of mirror-  B shoulder flex x10  B shoulder abduction x10  Manual and verbal cueing    PROM R shoulder w functional manual release R supraspinatus , cervical spine    Posture ed    1/2 FR-B, alt UE overhead x10    SB, lev  scap, rotation c-spine with OP x2 ea     Ther Ex:     Subj assessment      Scifit F/B 3 min ea L3    Scap retraction + depression cable 10# 2x10    B shoulder ext cable 10# 2x10    Thoracic rotation with cervical rotation cable 10# R/L 2x10    1/2 foam roller-  Alt shoulder flexion  Scaption  1# x10    Thoracic rotation-R/L -hands on wall    R Upper trap stretch -  20 sec x3 ROM  Strength testing    Manual therapy:   R upper trap, cervical paraspinals, levator scapula, scalenes    GIASTM upper trap, cervical paraspinals, levator scapula, scalenes Manual therapy:   R upper trap, cervical paraspinals, levator scapula, scalenes, supraspinatus     GIASTM upper trap, cervical paraspinals, levator scapula, scalenes Manual therapy:   R upper trap, cervical paraspinals, levator scapula, scalenes, supraspinatus     GIASTM upper trap, cervical paraspinals, levator scapula, scalenes Manual therapy:   R upper trap, cervical paraspinals, levator scapula, scalenes, supraspinatus     GIASTM upper trap, cervical paraspinals, levator scapula, scalenes     MHP R upper trap/cervical 5 min MHP R upper trap/cervical 2 min MHP R upper trap/cervical 2 min MHP R upper trap/cervical 2 min             HEP: scap retraction x10, pendulum x10-for pain, c-spine SB x3 20 sec, cervical rotation x10-w and wo towel, cane-flex x10, flexion up wall x10, abduction sitting w towel x10 BID    Charges: [place] 25', MT 15' [facility]-n/c 2 min       Total Timed Treatment: 43 min  Total Treatment Time: 45 min               infant/actual

## 2024-02-15 ENCOUNTER — OFFICE VISIT (OUTPATIENT)
Dept: PHYSICAL THERAPY | Facility: HOSPITAL | Age: 39
End: 2024-02-15
Attending: FAMILY MEDICINE
Payer: OTHER MISCELLANEOUS

## 2024-02-15 PROCEDURE — 97110 THERAPEUTIC EXERCISES: CPT

## 2024-02-15 PROCEDURE — 97140 MANUAL THERAPY 1/> REGIONS: CPT

## 2024-02-15 NOTE — PROGRESS NOTES
Diagnosis:   Neck pain (M54.2)   Strain of right shoulder, subsequent encounter (S43.899O)          Comments:  Neck pain with shoulder pain - 2-3 times per week for 2 weeks       Referring Provider: Ac Horn  Date of Evaluation:    1/25/2024    Precautions:  None      Neck Disability Index Score  No data recorded Next MD visit:   none scheduled  Date of Surgery: n/a   Insurance Primary/Secondary: [employer] / N/A     # Auth Visits: 12            MRI of the right shoulder: mild AC joint arthritis and low grade subscapularis irregularity/tearing.  Intact remainder of rotator cuff tendons.  Type II acromion.     Subjective:   R shoulder feeling better. Able to do light ADL's without difficulty.  Pt reports that he is feeling much better after coming to physical therapy.    Pain:  1/10 upper trap      Objective: Tx flow sheet     Cervical AROM: (* denotes performed with pain)  Flexion: 30 (was:20-pain)  Extension: 40 (was:30-pain)  Sidebending: B 30 (was:20-tight R side)  Rotation: B 50 (Was:R 30-stiff; L 35)    Shoulder AROM:standing  Flexion  R 125*(was:110*) L wnl  Abduction R 160* (was:110*) L wnl  ER R 50* (was:40*) L 60, supine R 60  IR R T10* (was:T12*) L T6, supine R 50    Observation/Posture: FHP 15 degrees (was:25 degrees), 10 degrees upper cervical extensionrounded shoulders, MIN muscle guarding R Shoulder    Accessory motion: decreased R lateral glides C4-6, NO mm guarding w CPA C2-5  Palpation: tenderness and tension R upper/mid trap, scalenes, supraspinatus, levator scapula , no tenderness R AC joint    Strength: (* denotes performed with pain)  UE/Scapular   Shoulder Flex: R 4/5 (was:4-/5*), L 5/5  Shoulder ABD (C5): R 4/5 (was:4-/5*), L 5/5  Biceps (C6): R 5/5, L 5/5  Wrist ext (C6): R 5/5, L 5/5  Triceps (C7): R 5/5, L 5/5  Wrist Flex (C7): R 5/5, L 5/5  Digit Flex (C8): R 5/5, L 5/5  Thumb Ext (C8): R 5/5, L 5/5  Interossei (T1): R 5/5, L 5/5    Rhomboids: R 4/5 (was:4-/5*), L 5/5  Mid trap:  R 4/5 (was:4-/5*); L 5/5  Lats: R nt, L nt  Low trap: R 4-/5*; L 5/5             Assessment:   Pt reported resolution of dull ache R upper trap after manual therapy.  Improved R shoulder flexion to 150 after Manual therapy: to supraspinatus .   Pt responded well to functional manual release with shoulder PROM which initially was tender but resolved.  Pt tolerated progression to strengthening and stretching exs without difficulty.   Manual and verbal cueing to correct R scapular elevation with R shoulder flexion which pt started to self correct throughout session.  Added: KT R upper trap for decompression of soft tissue.  Pt appeared to only have intermittent difficulty with standing and performing full shoulder flexion and scaption secondary to R upper trap soreness.      Goals:   Goals: (to be met in 10 visits) -progressing  Pt will improve cervical AROM flexion by 20 degrees to improve tolerance for looking down to tie shoes   Pt will improve cervical AROM extension by 20 degrees to improve tolerance for putting dishes into overhead cabinets   Pt will improve cervical AROM rotation to >20 degrees to improve tolerance for turning head to check blind spot while driving  Pt will improve postural strength (mid/low trap) to 5/5 to promote improved upright posturing and decreased pain with lifting   Pt will improve shoulder flexion AROM to >/=150 degrees to be able to reach into overhead cabinets without pain or restriction   Pt will improve shoulder abduction AROM to >/=150 degrees to improve ability to don deodorant, don/doff shirts, and wash hair   Pt will increase shoulder AROM ER to 90 to be able to reach and fasten seatbelt   Pt will increase shoulder AROM IR to 70 to be able to reach in back pocket, tuck in shirt, and turn steering wheel without pain  Pt will improve shoulder strength throughout to 5/5 to improve function with lifting and reaching   Pt will be independent and compliant with comprehensive HEP to  maintain progress achieved in PT      Plan: Continue plan of care as pt tolerates with focus on neck rehab. Next visit: lite strengthening, Next visit: test strengthening and range of motion   Date:    1/31/2024              TX#: 3/ Date:        2/6/2024          TX#: 4/ Date:      2/8/2024            TX#: 5/ Date: 2/13/2024   Tx#: 6/ Date:  2/15/2024   Tx#: 7/ Date:   Tx#: 8/ Date:   Tx#: 9/ Date:   Tx#: 10/ Date:   Tx#: 11 Date:   Tx#: 12/   Ther Ex:     Subj assessment      Scifit F/B 3 min ea L1 hills    Pulleys x30    Chin tuck x10    Scap retraction + depression red spri 2x10    B shoulder ext red spri 2x10      Doorway stretch 20 sec x2    TRX shoulder stretch wall out 5 sec hold x10 w scap retraction and depression    Roll ball up wall x10 w chin tuck    R Upper trap stretch -  20 sec x3    Posture ed Ther Ex:     Subj assessment      Scifit F/B 3 min ea L2     Scap retraction + depression red spri 2x10    B shoulder ext red spri 2x10    ER YTB x10  IR YTB x10    Doorway stretch 20 sec x2    Roll ball up wall x20 w chin tuck    R Upper trap stretch -  20 sec x3    Standing in front of mirror-  B shoulder flex x10  B shoulder abduction x10  Manual and verbal cueing    PROM R shoulder w functional manual release R supraspinatus , cervical spine    Posture ed    Pt education: use of tennis/golf ball for ischemic compression w functional manual release for HEP     Ther Ex:     Subj assessment      Scifit F/B 3 min ea L2     Scap retraction + depression red spri 2x10    B shoulder ext red spri 2x10    ER RTB x10  IR RTB x10    Doorway stretch 20 sec x2    Roll ball up wall x20 w chin tuck    R Upper trap stretch -  20 sec x3    Standing in front of mirror-  B shoulder flex x10  B shoulder abduction x10  Manual and verbal cueing    PROM R shoulder w functional manual release R supraspinatus , cervical spine    Posture ed    1/2 FR-B, alt UE overhead x10    SB, lev scap, rotation c-spine with OP x2 ea     Ther Ex:      Subj assessment      Scifit F/B 3 min ea L3    Scap retraction + depression cable 10# 2x10    B shoulder ext cable 10# 2x10    Thoracic rotation with cervical rotation cable 10# R/L 2x10    1/2 foam roller-standing  Alt shoulder flexion  Scaption  1# x10    Thoracic rotation-R/L -hands on wall    R Upper trap stretch -  20 sec x3 Ther Ex:     Subj assessment      Scifit F/B 3 min ea L3    Scap retraction + depression cable 10# 2x10    B shoulder ext cable 10# 2x10    Thoracic rotation with cervical rotation cable 10# R/L 2x10    1/2 foam roller-standing  Alt shoulder flexion  B shoulder flexion  Scaption  1# x10    Thoracic rotation-R/L -hands on wall    R Upper trap stretch -  20 sec x3    KT R upper trap        Manual therapy:   R upper trap, cervical paraspinals, levator scapula, scalenes    GIASTM upper trap, cervical paraspinals, levator scapula, scalenes Manual therapy:   R upper trap, cervical paraspinals, levator scapula, scalenes, supraspinatus     GIASTM upper trap, cervical paraspinals, levator scapula, scalenes Manual therapy:   R upper trap, cervical paraspinals, levator scapula, scalenes, supraspinatus     GIASTM upper trap, cervical paraspinals, levator scapula, scalenes Manual therapy:   R upper trap, cervical paraspinals, levator scapula, scalenes, supraspinatus     GIASTM upper trap, cervical paraspinals, levator scapula, scalenes Manual therapy:   R upper trap, cervical paraspinals, levator scapula, scalenes, supraspinatus     GIASTM upper trap, cervical paraspinals, levator scapula, scalenes        MHP R upper trap/cervical 5 min MHP R upper trap/cervical 2 min MHP R upper trap/cervical 2 min MHP R upper trap/cervical 2 min MHP R upper trap/cervical 2 min                    HEP: scap retraction x10, pendulum x10-for pain, c-spine SB x3 20 sec, cervical rotation x10-w and wo towel, cane-flex x10, flexion up wall x10, abduction sitting w towel x10 BID    Charges: [place] 30', MT 15' MHP-n/c 2 min        Total Timed Treatment: 45 min  Total Treatment Time: 47 min

## 2024-02-19 ENCOUNTER — OFFICE VISIT (OUTPATIENT)
Dept: PHYSICAL THERAPY | Facility: HOSPITAL | Age: 39
End: 2024-02-19
Attending: PREVENTIVE MEDICINE
Payer: OTHER MISCELLANEOUS

## 2024-02-19 PROCEDURE — 97140 MANUAL THERAPY 1/> REGIONS: CPT

## 2024-02-19 PROCEDURE — 97110 THERAPEUTIC EXERCISES: CPT

## 2024-02-21 ENCOUNTER — OFFICE VISIT (OUTPATIENT)
Dept: PHYSICAL THERAPY | Facility: HOSPITAL | Age: 39
End: 2024-02-21
Attending: PREVENTIVE MEDICINE
Payer: OTHER MISCELLANEOUS

## 2024-02-21 PROCEDURE — 97140 MANUAL THERAPY 1/> REGIONS: CPT

## 2024-02-21 PROCEDURE — 97110 THERAPEUTIC EXERCISES: CPT

## 2024-02-21 NOTE — PROGRESS NOTES
Diagnosis:   Neck pain (M54.2)   Strain of right shoulder, subsequent encounter (S42.790B)          Comments:  Neck pain with shoulder pain - 2-3 times per week for 2 weeks       Referring Provider: Ac Horn  Date of Evaluation:    1/25/2024    Precautions:  None      Neck Disability Index Score  No data recorded Next MD visit:   none scheduled  Date of Surgery: n/a   Insurance Primary/Secondary: [employer] / N/A     # Auth Visits: 12            MRI of the right shoulder: mild AC joint arthritis and low grade subscapularis irregularity/tearing.  Intact remainder of rotator cuff tendons.  Type II acromion.     Subjective:   R shoulder feeling better. Able to do light ADL's without difficulty but still having pain when he raises his arm overhead, intermittently. Some days no pain with reaching overhead.  Pt reports that he is feeling much better after coming to physical therapy.  KT helped. No discomfort from removing tape.    Pain:  1/10 upper trap      Objective: Tx flow sheet     Cervical AROM: (* denotes performed with pain)  Flexion: 30 (was:20-pain)  Extension: 40 (was:30-pain)  Sidebending: B 30 (was:20-tight R side)  Rotation: B 55 (Was:R 30-stiff; L 35)    Shoulder AROM:standing  Flexion  R 125*(was:110*) L wnl  Abduction R 160* (was:110*) L wnl  ER R 50* (was:40*) L 60, supine R 60  IR R T10* (was:T12*) L T6, supine R 50    Observation/Posture: FHP 15 degrees (was:25 degrees), 10 degrees upper cervical extensionrounded shoulders, MIN muscle guarding R Shoulder  SLOUCHED SITTING, small area of redness R lateral upper trap from tape.    Accessory motion: WNL R lateral glides C4-6, NO mm guarding w CPA C2-5, tenderness R AC joint  Palpation: tenderness and tension R upper/mid trap, scalenes, supraspinatus, levator scapula , min tenderness R AC joint and AC ligament    Strength: (* denotes performed with pain)  UE/Scapular   Shoulder Flex: R 4/5 (was:4-/5*), L 5/5  Shoulder ABD (C5): R 4/5  (was:4-/5*), L 5/5  Biceps (C6): R 5/5, L 5/5  Wrist ext (C6): R 5/5, L 5/5  Triceps (C7): R 5/5, L 5/5  Wrist Flex (C7): R 5/5, L 5/5  Digit Flex (C8): R 5/5, L 5/5  Thumb Ext (C8): R 5/5, L 5/5  Interossei (T1): R 5/5, L 5/5    Rhomboids: R 4/5 (was:4-/5*), L 5/5  Mid trap: R 4/5 (was:4-/5*); L 5/5  Lats: R nt, L nt  Low trap: R 4-/5*; L 5/5           Assessment:   Improved R shoulder PROM flexion to 100% without pain after addition of Manual therapy: to R AC joint and AC ligament.   Pt responded well to functional manual release with shoulder PROM which initially was tender but resolved.  Pt tolerated progression to strengthening and stretching exs without difficulty, including RC strengthening with addition of scapular strengthening.   Manual and verbal cueing to correct R scapular elevation with R shoulder flexion which pt started to self correct throughout session.  Adjusted KT to support of R GH/AC joint.  Pt appeared to understand correct technique to remove tape.    Goals:   Goals: (to be met in 10 visits) -progressing  Pt will improve cervical AROM flexion by 20 degrees to improve tolerance for looking down to tie shoes   Pt will improve cervical AROM extension by 20 degrees to improve tolerance for putting dishes into overhead cabinets   Pt will improve cervical AROM rotation to >20 degrees to improve tolerance for turning head to check blind spot while driving  Pt will improve postural strength (mid/low trap) to 5/5 to promote improved upright posturing and decreased pain with lifting   Pt will improve shoulder flexion AROM to >/=150 degrees to be able to reach into overhead cabinets without pain or restriction -MET END OF SESSION  Pt will improve shoulder abduction AROM to >/=150 degrees to improve ability to don deodorant, don/doff shirts, and wash hair -MET END OF SESSION  Pt will increase shoulder AROM ER to 90 to be able to reach and fasten seatbelt   Pt will increase shoulder AROM IR to 70 to be  able to reach in back pocket, tuck in shirt, and turn steering wheel without pain  Pt will improve shoulder strength throughout to 5/5 to improve function with lifting and reaching   Pt will be independent and compliant with comprehensive HEP to maintain progress achieved in PT      Plan: Continue plan of care as pt tolerates with focus on neck rehab. Next visit: lite strengthening, Next visit: test strengthening and range of motion   Date:    1/31/2024              TX#: 3/ Date:        2/6/2024          TX#: 4/ Date:      2/8/2024            TX#: 5/ Date: 2/13/2024   Tx#: 6/ Date:  2/15/2024   Tx#: 7/ Date: 2/21/2024   Tx#: 8/ Date:   Tx#: 9/ Date:   Tx#: 10/ Date:   Tx#: 11 Date:   Tx#: 12/   Ther Ex:     Subj assessment      Scifit F/B 3 min ea L1 hills    Pulleys x30    Chin tuck x10    Scap retraction + depression red spri 2x10    B shoulder ext red spri 2x10      Doorway stretch 20 sec x2    TRX shoulder stretch wall out 5 sec hold x10 w scap retraction and depression    Roll ball up wall x10 w chin tuck    R Upper trap stretch -  20 sec x3    Posture ed Ther Ex:     Subj assessment      Scifit F/B 3 min ea L2     Scap retraction + depression red spri 2x10    B shoulder ext red spri 2x10    ER YTB x10  IR YTB x10    Doorway stretch 20 sec x2    Roll ball up wall x20 w chin tuck    R Upper trap stretch -  20 sec x3    Standing in front of mirror-  B shoulder flex x10  B shoulder abduction x10  Manual and verbal cueing    PROM R shoulder w functional manual release R supraspinatus , cervical spine    Posture ed    Pt education: use of tennis/golf ball for ischemic compression w functional manual release for HEP     Ther Ex:     Subj assessment      Scifit F/B 3 min ea L2     Scap retraction + depression red spri 2x10    B shoulder ext red spri 2x10    ER RTB x10  IR RTB x10    Doorway stretch 20 sec x2    Roll ball up wall x20 w chin tuck    R Upper trap stretch -  20 sec x3    Standing in front of mirror-  B  shoulder flex x10  B shoulder abduction x10  Manual and verbal cueing    PROM R shoulder w functional manual release R supraspinatus , cervical spine    Posture ed    1/2 FR-B, alt UE overhead x10    SB, lev scap, rotation c-spine with OP x2 ea     Ther Ex:     Subj assessment      Scifit F/B 3 min ea L3    Scap retraction + depression cable 10# 2x10    B shoulder ext cable 10# 2x10    Thoracic rotation with cervical rotation cable 10# R/L 2x10    1/2 foam roller-standing  Alt shoulder flexion  Scaption  1# x10    Thoracic rotation-R/L -hands on wall    R Upper trap stretch -  20 sec x3 Ther Ex:     Subj assessment      Scifit F/B 3 min ea L3    Wall wipes red ball x20 w chin tuck    ER YTB x10  IR YTB x10    Prone-  Scap retraction/depression+  Sh ext  Habd  Row  X10 ea    Scap retraction + depression cable 10# x20    B shoulder ext cable 10# x20    Thoracic rotation with cervical rotation cable 10# R/L x20    1/2 foam roller-standing  Alt shoulder flexion  B shoulder flexion  Scaption  1# x10    Thoracic rotation w habd R/L -hands on wall    R Upper trap stretch -  20 sec x3    KT R upper trap Ther Ex:     Subj assessment      Scifit F/B 3 min ea L3    Wall wipes red ball x20 w chin tuck    Wall push ups x15    ER Red spri 2 x10  IR Red spri 2x10    Prone-  Scap retraction/depression+  Sh ext  Habd  Row  X10 ea    Scap retraction + depression cable 10# x20    B shoulder ext cable 10# x20    Thoracic rotation with cervical rotation cable 10# R/L x20    1/2 foam roller-standing  Alt shoulder flexion  B shoulder flexion  Scaption  1# x10    Thoracic rotation w habd R/L -hands on wall    R Upper trap stretch -  20 sec x3    KT R supportive GH/AC jt   Cervical ROM  Shoulder ROM  Shoulder strength      Manual therapy:   R upper trap, cervical paraspinals, levator scapula, scalenes    GIASTM upper trap, cervical paraspinals, levator scapula, scalenes Manual therapy:   R upper trap, cervical paraspinals, levator scapula,  scalenes, supraspinatus     GIASTM upper trap, cervical paraspinals, levator scapula, scalenes Manual therapy:   R upper trap, cervical paraspinals, levator scapula, scalenes, supraspinatus     GIASTM upper trap, cervical paraspinals, levator scapula, scalenes Manual therapy:   R upper trap, cervical paraspinals, levator scapula, scalenes, supraspinatus     GIASTM upper trap, cervical paraspinals, levator scapula, scalenes Manual therapy:   R upper trap, cervical paraspinals, levator scapula, scalenes, supraspinatus , R AC joint and AC ligament    GIASTM upper trap, cervical paraspinals, levator scapula, scalenes Manual therapy:   R upper trap, cervical paraspinals, levator scapula, scalenes, supraspinatus , R AC joint and AC ligament    GIASTM upper trap, cervical paraspinals, levator scapula, scalenes, AC jt/ligament    AC gapping>clavicle gr II-III multiple bouts        MHP R upper trap/cervical 5 min MHP R upper trap/cervical 2 min MHP R upper trap/cervical 2 min MHP R upper trap/cervical 2 min MHP R upper trap/cervical 2 min MHP R upper trap/cervical 2 min                   HEP: scap retraction x10, pendulum x10-for pain, c-spine SB x3 20 sec, cervical rotation x10-w and wo towel, cane-flex x10, flexion up wall x10, abduction sitting w towel x10 BID    Charges: [place] 25', [place] 25' [facility]-n/c 5 min       Total Timed Treatment: 50 min  Total Treatment Time: 55 min

## 2024-02-27 ENCOUNTER — APPOINTMENT (OUTPATIENT)
Dept: PHYSICAL THERAPY | Facility: HOSPITAL | Age: 39
End: 2024-02-27
Attending: PREVENTIVE MEDICINE
Payer: OTHER MISCELLANEOUS

## 2024-02-29 ENCOUNTER — OFFICE VISIT (OUTPATIENT)
Dept: PHYSICAL THERAPY | Facility: HOSPITAL | Age: 39
End: 2024-02-29
Attending: PREVENTIVE MEDICINE
Payer: OTHER MISCELLANEOUS

## 2024-02-29 PROCEDURE — 97110 THERAPEUTIC EXERCISES: CPT

## 2024-02-29 PROCEDURE — 97140 MANUAL THERAPY 1/> REGIONS: CPT

## 2024-02-29 NOTE — PROGRESS NOTES
Diagnosis:   Neck pain (M54.2)   Strain of right shoulder, subsequent encounter (S48.803W)          Comments:  Neck pain with shoulder pain - 2-3 times per week for 2 weeks       Referring Provider: Ac Horn  Date of Evaluation:    1/25/2024    Precautions:  None      Neck Disability Index Score  No data recorded Next MD visit:   none scheduled  Date of Surgery: n/a   Insurance Primary/Secondary: [employer] / N/A     # Auth Visits: 12            MRI of the right shoulder: mild AC joint arthritis and low grade subscapularis irregularity/tearing.  Intact remainder of rotator cuff tendons.  Type II acromion.     Subjective:   R shoulder feeling better. Able to do light ADL's without difficulty but still having pain when he raises his arm overhead, intermittently. Some days no pain with reaching overhead.  Little bit of achiness R shoulder.  Able to lift cat at home which weighs ~11.5#.  Pt reports that he is feeling much better after coming to physical therapy.  KT helped-supportive taping helped reduce pain more. No discomfort from removing tape.    Pain:  1/10 upper trap      Objective: Tx flow sheet     Cervical AROM: (* denotes performed with pain)  Flexion: 50 (was:20-pain)  Extension: 60 (was:30-pain)  Sidebending: B 35(was:20-tight R side)  Rotation: B 65 (Was:R 30-stiff; L 35)    Shoulder AROM:standing  Flexion  R 125*(was:110*) L 145 - slouched sitting  Abduction R 160* (was:110*) L wnl  ER R 50* (was:40*) L 60, supine R 60  IR R T10* (was:T12*) L T6, supine R 50      Strength: (* denotes performed with pain)  UE/Scapular   Shoulder Flex: R 5/-5 (was:4-/5*), L 5/5  Shoulder ABD (C5): R 5-/5 (was:4-/5*), L 5/5  Biceps (C6): R 5/5, L 5/5  Wrist ext (C6): R 5/5, L 5/5  Triceps (C7): R 5/5, L 5/5  Wrist Flex (C7): R 5/5, L 5/5  Digit Flex (C8): R 5/5, L 5/5  Thumb Ext (C8): R 5/5, L 5/5  Interossei (T1): R 5/5, L 5/5    Rhomboids: R 5-/5 (was:4-/5*), L 5/5  Mid trap: R 5-/5 (was:4-/5*); L 5/5  Lats: R  nt, L nt  Low trap: R 4--5*; L 5/5           Observation/Posture: FHP 15 degrees (was:25 degrees), 10 degrees upper cervical extensionrounded shoulders, MIN muscle guarding R Shoulder  SLOUCHED SITTING, small area of redness R lateral upper trap from tape.    Accessory motion: WNL R lateral glides C4-6, NO mm guarding w CPA C2-5, tenderness R AC joint  Palpation: tenderness and tension R upper/mid trap, scalenes, supraspinatus, levator scapula , min tenderness R AC joint and AC ligament    Assessment:   Improved range of motion and strength as noted above.   Pt sits in very slouched position and manual corrected by physical therapist.   R shoulder flexion >125 still painful .  Pt able to do more activities of daily living without difficulty.  R shoulder PROM flexion to 100% without pain after addition of Manual therapy: to R AC joint and AC ligament.   Pt responded well to functional manual release with shoulder PROM which initially was tender but resolved.  Pt tolerated progression to strengthening and stretching exs without difficulty, including RC strengthening with addition of scapular strengthening.   Manual and verbal cueing to correct R scapular elevation with R shoulder flexion which pt started to self correct throughout session.  Adjusted KT to support of R GH/AC joint.  Pt appeared to understand correct technique to remove tape.    Goals:   Goals: (to be met in 10 visits) -progressing  Pt will improve cervical AROM flexion by 20 degrees to improve tolerance for looking down to tie shoes   Pt will improve cervical AROM extension by 20 degrees to improve tolerance for putting dishes into overhead cabinets   Pt will improve cervical AROM rotation to >20 degrees to improve tolerance for turning head to check blind spot while driving  Pt will improve postural strength (mid/low trap) to 5/5 to promote improved upright posturing and decreased pain with lifting   Pt will improve shoulder flexion AROM to >/=150  degrees to be able to reach into overhead cabinets without pain or restriction -MET END OF SESSION  Pt will improve shoulder abduction AROM to >/=150 degrees to improve ability to don deodorant, don/doff shirts, and wash hair -MET END OF SESSION  Pt will increase shoulder AROM ER to 90 to be able to reach and fasten seatbelt   Pt will increase shoulder AROM IR to 70 to be able to reach in back pocket, tuck in shirt, and turn steering wheel without pain  Pt will improve shoulder strength throughout to 5/5 to improve function with lifting and reaching   Pt will be independent and compliant with comprehensive HEP to maintain progress achieved in PT      Plan: Continue plan of care as pt tolerates with focus on neck rehab. Next visit: lite strengthening, Next visit: range of motion and strengthening with Manual therapy.  Date:    1/31/2024              TX#: 3/ Date:        2/6/2024          TX#: 4/ Date:      2/8/2024            TX#: 5/ Date: 2/13/2024   Tx#: 6/ Date:  2/15/2024   Tx#: 7/ Date: 2/21/2024   Tx#: 8/ Date: 2/29/2024   Tx#: 9/ Date:   Tx#: 10/ Date:   Tx#: 11 Date:   Tx#: 12/   Ther Ex:     Subj assessment      Scifit F/B 3 min ea L1 hills    Pulleys x30    Chin tuck x10    Scap retraction + depression red spri 2x10    B shoulder ext red spri 2x10      Doorway stretch 20 sec x2    TRX shoulder stretch wall out 5 sec hold x10 w scap retraction and depression    Roll ball up wall x10 w chin tuck    R Upper trap stretch -  20 sec x3    Posture ed Ther Ex:     Subj assessment      Scifit F/B 3 min ea L2     Scap retraction + depression red spri 2x10    B shoulder ext red spri 2x10    ER YTB x10  IR YTB x10    Doorway stretch 20 sec x2    Roll ball up wall x20 w chin tuck    R Upper trap stretch -  20 sec x3    Standing in front of mirror-  B shoulder flex x10  B shoulder abduction x10  Manual and verbal cueing    PROM R shoulder w functional manual release R supraspinatus , cervical spine    Posture ed    Pt  education: use of tennis/golf ball for ischemic compression w functional manual release for HEP     Ther Ex:     Subj assessment      Scifit F/B 3 min ea L2     Scap retraction + depression red spri 2x10    B shoulder ext red spri 2x10    ER RTB x10  IR RTB x10    Doorway stretch 20 sec x2    Roll ball up wall x20 w chin tuck    R Upper trap stretch -  20 sec x3    Standing in front of mirror-  B shoulder flex x10  B shoulder abduction x10  Manual and verbal cueing    PROM R shoulder w functional manual release R supraspinatus , cervical spine    Posture ed    1/2 FR-B, alt UE overhead x10    SB, lev scap, rotation c-spine with OP x2 ea     Ther Ex:     Subj assessment      Scifit F/B 3 min ea L3    Scap retraction + depression cable 10# 2x10    B shoulder ext cable 10# 2x10    Thoracic rotation with cervical rotation cable 10# R/L 2x10    1/2 foam roller-standing  Alt shoulder flexion  Scaption  1# x10    Thoracic rotation-R/L -hands on wall    R Upper trap stretch -  20 sec x3 Ther Ex:     Subj assessment      Scifit F/B 3 min ea L3    Wall wipes red ball x20 w chin tuck    ER YTB x10  IR YTB x10    Prone-  Scap retraction/depression+  Sh ext  Habd  Row  X10 ea    Scap retraction + depression cable 10# x20    B shoulder ext cable 10# x20    Thoracic rotation with cervical rotation cable 10# R/L x20    1/2 foam roller-standing  Alt shoulder flexion  B shoulder flexion  Scaption  1# x10    Thoracic rotation w habd R/L -hands on wall    R Upper trap stretch -  20 sec x3    KT R upper trap Ther Ex:     Subj assessment      Scifit F/B 3 min ea L3    Wall wipes red ball x20 w chin tuck    Wall push ups x15    ER Red spri 2 x10  IR Red spri 2x10    Prone-  Scap retraction/depression+  Sh ext  Habd  Row  X10 ea    Scap retraction + depression cable 10# x20    B shoulder ext cable 10# x20    Thoracic rotation with cervical rotation cable 10# R/L x20    1/2 foam roller-standing  Alt shoulder flexion  B shoulder  flexion  Scaption  1# x10    Thoracic rotation w habd R/L -hands on wall    R Upper trap stretch -  20 sec x3    KT R supportive GH/AC jt Ther Ex:     Subj assessment      Assessed:  Cervical ROM  Shoulder ROM  Shoulder strength    Scifit F/B 3 min ea L3    Wall wipes red ball x20 w chin tuck    Wall push ups x15    Prone-  Scap retraction/depression+  Sh ext  Habd  Row  X10 ea    Scap retraction + depression cable 10# x20    Precor IR/ER 10# 2x10    B shoulder ext cable 10# x20    Thoracic rotation with cervical rotation cable 10# R/L x20    1/2 foam roller-standing  Alt shoulder flexion--Not today   B shoulder flexion  Scaption  1# x10--Not today     Thoracic rotation w habd R/L -hands on wall    R Upper trap stretch -  20 sec x3    KT R supportive GH/AC jt      Manual therapy:   R upper trap, cervical paraspinals, levator scapula, scalenes    GIASTM upper trap, cervical paraspinals, levator scapula, scalenes Manual therapy:   R upper trap, cervical paraspinals, levator scapula, scalenes, supraspinatus     GIASTM upper trap, cervical paraspinals, levator scapula, scalenes Manual therapy:   R upper trap, cervical paraspinals, levator scapula, scalenes, supraspinatus     GIASTM upper trap, cervical paraspinals, levator scapula, scalenes Manual therapy:   R upper trap, cervical paraspinals, levator scapula, scalenes, supraspinatus     GIASTM upper trap, cervical paraspinals, levator scapula, scalenes Manual therapy:   R upper trap, cervical paraspinals, levator scapula, scalenes, supraspinatus , R AC joint and AC ligament    GIASTM upper trap, cervical paraspinals, levator scapula, scalenes Manual therapy:   R upper trap, cervical paraspinals, levator scapula, scalenes, supraspinatus , R AC joint and AC ligament    GIASTM upper trap, cervical paraspinals, levator scapula, scalenes, AC jt/ligament    AC gapping>clavicle gr II-III multiple bouts  Manual therapy:   R upper trap, cervical paraspinals, levator scapula,  scalenes, supraspinatus , R AC joint and AC ligament    GIASTM upper trap, cervical paraspinals, levator scapula, scalenes, AC jt/ligament    AC gapping>clavicle gr II-III multiple bouts       MHP R upper trap/cervical 5 min MHP R upper trap/cervical 2 min MHP R upper trap/cervical 2 min MHP R upper trap/cervical 2 min MHP R upper trap/cervical 2 min MHP R upper trap/cervical 2 min MHP -Not today                   HEP: scap retraction x10, pendulum x10-for pain, c-spine SB x3 20 sec, cervical rotation x10-w and wo towel, cane-flex x10, flexion up wall x10, abduction sitting w towel x10 BID    Charges: [place] 30', [place] 15'        Total Timed Treatment: 45 min  Total Treatment Time: 45 min

## 2024-03-04 ENCOUNTER — OFFICE VISIT (OUTPATIENT)
Dept: PHYSICAL THERAPY | Facility: HOSPITAL | Age: 39
End: 2024-03-04
Attending: FAMILY MEDICINE
Payer: OTHER MISCELLANEOUS

## 2024-03-04 PROCEDURE — 97110 THERAPEUTIC EXERCISES: CPT | Performed by: PHYSICAL THERAPY ASSISTANT

## 2024-03-04 PROCEDURE — 97140 MANUAL THERAPY 1/> REGIONS: CPT | Performed by: PHYSICAL THERAPY ASSISTANT

## 2024-03-04 NOTE — PROGRESS NOTES
Diagnosis:   Neck pain (M54.2)   Strain of right shoulder, subsequent encounter (S43.190E)          Comments:  Neck pain with shoulder pain - 2-3 times per week for 2 weeks       Referring Provider: Ac Horn  Date of Evaluation:    1/25/2024    Precautions:  None      Neck Disability Index Score  No data recorded Next MD visit:   none scheduled  Date of Surgery: n/a   Insurance Primary/Secondary: [employer] / N/A     # Auth Visits: 12          Visit 10 of 12     MRI of the right shoulder: mild AC joint arthritis and low grade subscapularis irregularity/tearing.  Intact remainder of rotator cuff tendons.  Type II acromion.     Subjective:   Right shoulder experiencing some posterior shoulder pain - stayed in a hotel over the weekend and I think maybe I slept weirdly on it. Overhead activities remain challenging - pain when I lift or raise my hand to 3/10. Took tape off last night - today my skin is still red so I will take a break this time  Pain:  1/10 upper trap      Objective: Tx flow sheet     Cervical AROM: (* denotes performed with pain)  Flexion: 50 (was:20-pain)  Extension: 60 (was:30-pain)  Sidebending: B 35(was:20-tight R side)  Rotation: B 65 (Was:R 30-stiff; L 35)    Shoulder AROM:standing  Flexion  R 125*(was:110*) L 145 - slouched sitting  Abduction R 160* (was:110*) L wnl  ER R 50* (was:40*) L 60, supine R 60  IR R T10* (was:T12*) L T6, supine R 50      Strength: (* denotes performed with pain)  UE/Scapular   Shoulder Flex: R 5/-5 (was:4-/5*), L 5/5  Shoulder ABD (C5): R 5-/5 (was:4-/5*), L 5/5  Biceps (C6): R 5/5, L 5/5  Wrist ext (C6): R 5/5, L 5/5  Triceps (C7): R 5/5, L 5/5  Wrist Flex (C7): R 5/5, L 5/5  Digit Flex (C8): R 5/5, L 5/5  Thumb Ext (C8): R 5/5, L 5/5  Interossei (T1): R 5/5, L 5/5    Rhomboids: R 5-/5 (was:4-/5*), L 5/5  Mid trap: R 5-/5 (was:4-/5*); L 5/5  Lats: R nt, L nt  Low trap: R 4--5*; L 5/5           Observation/Posture: FHP 15 degrees (was:25 degrees), 10 degrees  upper cervical extensionrounded shoulders, MIN muscle guarding R Shoulder  SLOUCHED SITTING.      Accessory motion: WNL R lateral glides C4-6, NO mm guarding w CPA C2-5, tenderness R AC joint  Palpation: tenderness and tension R upper/mid trap, scalenes, supraspinatus, levator scapula , min tenderness R AC joint and AC ligament    Assessment: Pt states increase in pain in  posterior, loy scapaular muscles from sleeping in hotel bed. Declines tape today as has ongoing redness from previous application with mild irritation. Continued with POC with emphasis on GHJ stability, postural corrections with verbal cues given to address forward and down position. Active R shoulder flexion >125 still painful R shoulder PROM flexion to 100% without pain after addition of Manual therapy. Pt tolerating progression to strengthening and stretching exs without difficulty, including RC strengthening with addition of scapular strengthening. Pt will see MD on 18th and hoping to lift driving restrictions in order to be able to return to work. Pt reported no adverse reaction in response to interventions today. PT remains necessary to address ongoing deficits as identified at initial assessment.        Goals:   Goals: (to be met in 10 visits) -progressing  Pt will improve cervical AROM flexion by 20 degrees to improve tolerance for looking down to tie shoes   Pt will improve cervical AROM extension by 20 degrees to improve tolerance for putting dishes into overhead cabinets   Pt will improve cervical AROM rotation to >20 degrees to improve tolerance for turning head to check blind spot while driving  Pt will improve postural strength (mid/low trap) to 5/5 to promote improved upright posturing and decreased pain with lifting   Pt will improve shoulder flexion AROM to >/=150 degrees to be able to reach into overhead cabinets without pain or restriction -MET END OF SESSION  Pt will improve shoulder abduction AROM to >/=150 degrees to improve  ability to don deodorant, don/doff shirts, and wash hair -MET END OF SESSION  Pt will increase shoulder AROM ER to 90 to be able to reach and fasten seatbelt   Pt will increase shoulder AROM IR to 70 to be able to reach in back pocket, tuck in shirt, and turn steering wheel without pain  Pt will improve shoulder strength throughout to 5/5 to improve function with lifting and reaching   Pt will be independent and compliant with comprehensive HEP to maintain progress achieved in PT      Plan: Continue plan of care as pt tolerates with focus on neck rehab. Next visit: lite strengthening, Next visit: range of motion and strengthening with Manual therapy.  Date:    1/31/2024              TX#: 3/ Date:        2/6/2024          TX#: 4/ Date:      2/8/2024            TX#: 5/ Date: 2/13/2024   Tx#: 6/ Date:  2/15/2024   Tx#: 7/ Date: 2/21/2024   Tx#: 8/ 12 Date: 2/29/2024   Tx#: 9/12 Date:   Tx#: 10/ 12 Date:   Tx#: 11 Date:   Tx#: 12/   Ther Ex:     Subj assessment      Scifit F/B 3 min ea L1 hills    Pulleys x30    Chin tuck x10    Scap retraction + depression red spri 2x10    B shoulder ext red spri 2x10      Doorway stretch 20 sec x2    TRX shoulder stretch wall out 5 sec hold x10 w scap retraction and depression    Roll ball up wall x10 w chin tuck    R Upper trap stretch -  20 sec x3    Posture ed Ther Ex:     Subj assessment      Scifit F/B 3 min ea L2     Scap retraction + depression red spri 2x10    B shoulder ext red spri 2x10    ER YTB x10  IR YTB x10    Doorway stretch 20 sec x2    Roll ball up wall x20 w chin tuck    R Upper trap stretch -  20 sec x3    Standing in front of mirror-  B shoulder flex x10  B shoulder abduction x10  Manual and verbal cueing    PROM R shoulder w functional manual release R supraspinatus , cervical spine    Posture ed    Pt education: use of tennis/golf ball for ischemic compression w functional manual release for HEP     Ther Ex:     Subj assessment      Scifit F/B 3 min ea L2      Scap retraction + depression red spri 2x10    B shoulder ext red spri 2x10    ER RTB x10  IR RTB x10    Doorway stretch 20 sec x2    Roll ball up wall x20 w chin tuck    R Upper trap stretch -  20 sec x3    Standing in front of mirror-  B shoulder flex x10  B shoulder abduction x10  Manual and verbal cueing    PROM R shoulder w functional manual release R supraspinatus , cervical spine    Posture ed    1/2 FR-B, alt UE overhead x10    SB, lev scap, rotation c-spine with OP x2 ea     Ther Ex:     Subj assessment      Scifit F/B 3 min ea L3    Scap retraction + depression cable 10# 2x10    B shoulder ext cable 10# 2x10    Thoracic rotation with cervical rotation cable 10# R/L 2x10    1/2 foam roller-standing  Alt shoulder flexion  Scaption  1# x10    Thoracic rotation-R/L -hands on wall    R Upper trap stretch -  20 sec x3 Ther Ex:     Subj assessment      Scifit F/B 3 min ea L3    Wall wipes red ball x20 w chin tuck    ER YTB x10  IR YTB x10    Prone-  Scap retraction/depression+  Sh ext  Habd  Row  X10 ea    Scap retraction + depression cable 10# x20    B shoulder ext cable 10# x20    Thoracic rotation with cervical rotation cable 10# R/L x20    1/2 foam roller-standing  Alt shoulder flexion  B shoulder flexion  Scaption  1# x10    Thoracic rotation w habd R/L -hands on wall    R Upper trap stretch -  20 sec x3    KT R upper trap Ther Ex:     Subj assessment      Scifit F/B 3 min ea L3    Wall wipes red ball x20 w chin tuck    Wall push ups x15    ER Red spri 2 x10  IR Red spri 2x10    Prone-  Scap retraction/depression+  Sh ext  Habd  Row  X10 ea    Scap retraction + depression cable 10# x20    B shoulder ext cable 10# x20    Thoracic rotation with cervical rotation cable 10# R/L x20    1/2 foam roller-standing  Alt shoulder flexion  B shoulder flexion  Scaption  1# x10    Thoracic rotation w habd R/L -hands on wall    R Upper trap stretch -  20 sec x3    KT R supportive GH/AC jt Ther Ex:     Subj assessment       Assessed:  Cervical ROM  Shoulder ROM  Shoulder strength    Scifit F/B 3 min ea L3    Wall wipes red ball x20 w chin tuck    Wall push ups x15    Prone-  Scap retraction/depression+  Sh ext  Habd  Row  X10 ea    Scap retraction + depression cable 10# x20    Precor IR/ER 10# 2x10    B shoulder ext cable 10# x20    Thoracic rotation with cervical rotation cable 10# R/L x20    1/2 foam roller-standing  Alt shoulder flexion--Not today   B shoulder flexion  Scaption  1# x10--Not today     Thoracic rotation w habd R/L -hands on wall    R Upper trap stretch -  20 sec x3    KT R supportive GH/AC jt Ther ex     Subj assessment      Scifit F/B 3 min ea L3    Prone-  Scap retraction/depression+  Sh ext  Habd  Row  Y x 10 NEW  X10 ea    Seated scap retraction to address postural defcits x 20    Standing , 1/2 FR, W retractions. X 10   UT stretch x 20 secs x 5   Standing with 1/2 FR 90 / 90 ER / IR with GTB resistance x 2 x 10 NEW    Standing thoracic rotation x 10     1/2 foam roller-standing  Alt shoulder flexion   B shoulder flexion  Scaption  1# x10  B shoulder ext cable 10# x20           Manual therapy:   R upper trap, cervical paraspinals, levator scapula, scalenes    GIASTM upper trap, cervical paraspinals, levator scapula, scalenes Manual therapy:   R upper trap, cervical paraspinals, levator scapula, scalenes, supraspinatus     GIASTM upper trap, cervical paraspinals, levator scapula, scalenes Manual therapy:   R upper trap, cervical paraspinals, levator scapula, scalenes, supraspinatus     GIASTM upper trap, cervical paraspinals, levator scapula, scalenes Manual therapy:   R upper trap, cervical paraspinals, levator scapula, scalenes, supraspinatus     GIASTM upper trap, cervical paraspinals, levator scapula, scalenes Manual therapy:   R upper trap, cervical paraspinals, levator scapula, scalenes, supraspinatus , R AC joint and AC ligament    GIASTM upper trap, cervical paraspinals, levator scapula, scalenes Manual  therapy:   R upper trap, cervical paraspinals, levator scapula, scalenes, supraspinatus , R AC joint and AC ligament    GIASTM upper trap, cervical paraspinals, levator scapula, scalenes, AC jt/ligament    AC gapping>clavicle gr II-III multiple bouts  Manual therapy:   R upper trap, cervical paraspinals, levator scapula, scalenes, supraspinatus , R AC joint and AC ligament    GIASTM upper trap, cervical paraspinals, levator scapula, scalenes, AC jt/ligament    AC gapping>clavicle gr II-III multiple bouts       MHP R upper trap/cervical 5 min MHP R upper trap/cervical 2 min MHP R upper trap/cervical 2 min MHP R upper trap/cervical 2 min MHP R upper trap/cervical 2 min MHP R upper trap/cervical 2 min MHP -Not today                   HEP: scap retraction x10, pendulum x10-for pain, c-spine SB x3 20 sec, cervical rotation x10-w and wo towel, cane-flex x10, flexion up wall x10, abduction sitting w towel x10 BID    Charges: Tex2 30', MT 15'        Total Timed Treatment: 45 min  Total Treatment Time: 45 min

## 2024-03-04 NOTE — PATIENT INSTRUCTIONS
Diagnosis:   Neck pain (M54.2)   Strain of right shoulder, subsequent encounter (S41.913P)          Comments:  Neck pain with shoulder pain - 2-3 times per week for 2 weeks       Referring Provider: Ac Horn  Date of Evaluation:    1/25/2024    Precautions:  None      Neck Disability Index Score  No data recorded Next MD visit:   none scheduled  Date of Surgery: n/a   Insurance Primary/Secondary: [employer] / N/A     # Auth Visits: 12            MRI of the right shoulder: mild AC joint arthritis and low grade subscapularis irregularity/tearing.  Intact remainder of rotator cuff tendons.  Type II acromion.     Subjective:   R shoulder feeling better. Able to do light ADL's without difficulty but still having pain when he raises his arm overhead, intermittently. Some days no pain with reaching overhead.  Little bit of achiness R shoulder.  Able to lift cat at home which weighs ~11.5#.  Pt reports that he is feeling much better after coming to physical therapy.  KT helped-supportive taping helped reduce pain more. No discomfort from removing tape.    Pain:  1/10 upper trap      Objective: Tx flow sheet     Cervical AROM: (* denotes performed with pain)  Flexion: 50 (was:20-pain)  Extension: 60 (was:30-pain)  Sidebending: B 35(was:20-tight R side)  Rotation: B 65 (Was:R 30-stiff; L 35)    Shoulder AROM:standing  Flexion  R 125*(was:110*) L 145 - slouched sitting  Abduction R 160* (was:110*) L wnl  ER R 50* (was:40*) L 60, supine R 60  IR R T10* (was:T12*) L T6, supine R 50      Strength: (* denotes performed with pain)  UE/Scapular   Shoulder Flex: R 5/-5 (was:4-/5*), L 5/5  Shoulder ABD (C5): R 5-/5 (was:4-/5*), L 5/5  Biceps (C6): R 5/5, L 5/5  Wrist ext (C6): R 5/5, L 5/5  Triceps (C7): R 5/5, L 5/5  Wrist Flex (C7): R 5/5, L 5/5  Digit Flex (C8): R 5/5, L 5/5  Thumb Ext (C8): R 5/5, L 5/5  Interossei (T1): R 5/5, L 5/5    Rhomboids: R 5-/5 (was:4-/5*), L 5/5  Mid trap: R 5-/5 (was:4-/5*); L 5/5  Lats: R  nt, L nt  Low trap: R 4--5*; L 5/5           Observation/Posture: FHP 15 degrees (was:25 degrees), 10 degrees upper cervical extensionrounded shoulders, MIN muscle guarding R Shoulder  SLOUCHED SITTING, small area of redness R lateral upper trap from tape.    Accessory motion: WNL R lateral glides C4-6, NO mm guarding w CPA C2-5, tenderness R AC joint  Palpation: tenderness and tension R upper/mid trap, scalenes, supraspinatus, levator scapula , min tenderness R AC joint and AC ligament    Assessment:   Improved range of motion and strength as noted above.   Pt sits in very slouched position and manual corrected by physical therapist.   R shoulder flexion >125 still painful .  Pt able to do more activities of daily living without difficulty.  R shoulder PROM flexion to 100% without pain after addition of Manual therapy: to R AC joint and AC ligament.   Pt responded well to functional manual release with shoulder PROM which initially was tender but resolved.  Pt tolerated progression to strengthening and stretching exs without difficulty, including RC strengthening with addition of scapular strengthening.   Manual and verbal cueing to correct R scapular elevation with R shoulder flexion which pt started to self correct throughout session.  Adjusted KT to support of R GH/AC joint.  Pt appeared to understand correct technique to remove tape.    Goals:   Goals: (to be met in 10 visits) -progressing  Pt will improve cervical AROM flexion by 20 degrees to improve tolerance for looking down to tie shoes   Pt will improve cervical AROM extension by 20 degrees to improve tolerance for putting dishes into overhead cabinets   Pt will improve cervical AROM rotation to >20 degrees to improve tolerance for turning head to check blind spot while driving  Pt will improve postural strength (mid/low trap) to 5/5 to promote improved upright posturing and decreased pain with lifting   Pt will improve shoulder flexion AROM to >/=150  degrees to be able to reach into overhead cabinets without pain or restriction -MET END OF SESSION  Pt will improve shoulder abduction AROM to >/=150 degrees to improve ability to don deodorant, don/doff shirts, and wash hair -MET END OF SESSION  Pt will increase shoulder AROM ER to 90 to be able to reach and fasten seatbelt   Pt will increase shoulder AROM IR to 70 to be able to reach in back pocket, tuck in shirt, and turn steering wheel without pain  Pt will improve shoulder strength throughout to 5/5 to improve function with lifting and reaching   Pt will be independent and compliant with comprehensive HEP to maintain progress achieved in PT      Plan: Continue plan of care as pt tolerates with focus on neck rehab. Next visit: lite strengthening, Next visit: range of motion and strengthening with Manual therapy.  Date:    1/31/2024              TX#: 3/ Date:        2/6/2024          TX#: 4/ Date:      2/8/2024            TX#: 5/ Date: 2/13/2024   Tx#: 6/ Date:  2/15/2024   Tx#: 7/ Date: 2/21/2024   Tx#: 8/ Date: 2/29/2024   Tx#: 9/ Date:   Tx#: 10/ Date:   Tx#: 11 Date:   Tx#: 12/   Ther Ex:     Subj assessment      Scifit F/B 3 min ea L1 hills    Pulleys x30    Chin tuck x10    Scap retraction + depression red spri 2x10    B shoulder ext red spri 2x10      Doorway stretch 20 sec x2    TRX shoulder stretch wall out 5 sec hold x10 w scap retraction and depression    Roll ball up wall x10 w chin tuck    R Upper trap stretch -  20 sec x3    Posture ed Ther Ex:     Subj assessment      Scifit F/B 3 min ea L2     Scap retraction + depression red spri 2x10    B shoulder ext red spri 2x10    ER YTB x10  IR YTB x10    Doorway stretch 20 sec x2    Roll ball up wall x20 w chin tuck    R Upper trap stretch -  20 sec x3    Standing in front of mirror-  B shoulder flex x10  B shoulder abduction x10  Manual and verbal cueing    PROM R shoulder w functional manual release R supraspinatus , cervical spine    Posture ed    Pt  education: use of tennis/golf ball for ischemic compression w functional manual release for HEP     Ther Ex:     Subj assessment      Scifit F/B 3 min ea L2     Scap retraction + depression red spri 2x10    B shoulder ext red spri 2x10    ER RTB x10  IR RTB x10    Doorway stretch 20 sec x2    Roll ball up wall x20 w chin tuck    R Upper trap stretch -  20 sec x3    Standing in front of mirror-  B shoulder flex x10  B shoulder abduction x10  Manual and verbal cueing    PROM R shoulder w functional manual release R supraspinatus , cervical spine    Posture ed    1/2 FR-B, alt UE overhead x10    SB, lev scap, rotation c-spine with OP x2 ea     Ther Ex:     Subj assessment      Scifit F/B 3 min ea L3    Scap retraction + depression cable 10# 2x10    B shoulder ext cable 10# 2x10    Thoracic rotation with cervical rotation cable 10# R/L 2x10    1/2 foam roller-standing  Alt shoulder flexion  Scaption  1# x10    Thoracic rotation-R/L -hands on wall    R Upper trap stretch -  20 sec x3 Ther Ex:     Subj assessment      Scifit F/B 3 min ea L3    Wall wipes red ball x20 w chin tuck    ER YTB x10  IR YTB x10    Prone-  Scap retraction/depression+  Sh ext  Habd  Row  X10 ea    Scap retraction + depression cable 10# x20    B shoulder ext cable 10# x20    Thoracic rotation with cervical rotation cable 10# R/L x20    1/2 foam roller-standing  Alt shoulder flexion  B shoulder flexion  Scaption  1# x10    Thoracic rotation w habd R/L -hands on wall    R Upper trap stretch -  20 sec x3    KT R upper trap Ther Ex:     Subj assessment      Scifit F/B 3 min ea L3    Wall wipes red ball x20 w chin tuck    Wall push ups x15    ER Red spri 2 x10  IR Red spri 2x10    Prone-  Scap retraction/depression+  Sh ext  Habd  Row  X10 ea    Scap retraction + depression cable 10# x20    B shoulder ext cable 10# x20    Thoracic rotation with cervical rotation cable 10# R/L x20    1/2 foam roller-standing  Alt shoulder flexion  B shoulder  flexion  Scaption  1# x10    Thoracic rotation w habd R/L -hands on wall    R Upper trap stretch -  20 sec x3    KT R supportive GH/AC jt Ther Ex:     Subj assessment      Assessed:  Cervical ROM  Shoulder ROM  Shoulder strength    Scifit F/B 3 min ea L3    Wall wipes red ball x20 w chin tuck    Wall push ups x15    Prone-  Scap retraction/depression+  Sh ext  Habd  Row  X10 ea    Scap retraction + depression cable 10# x20    Precor IR/ER 10# 2x10    B shoulder ext cable 10# x20    Thoracic rotation with cervical rotation cable 10# R/L x20    1/2 foam roller-standing  Alt shoulder flexion--Not today   B shoulder flexion  Scaption  1# x10--Not today     Thoracic rotation w habd R/L -hands on wall    R Upper trap stretch -  20 sec x3    KT R supportive GH/AC jt      Manual therapy:   R upper trap, cervical paraspinals, levator scapula, scalenes    GIASTM upper trap, cervical paraspinals, levator scapula, scalenes Manual therapy:   R upper trap, cervical paraspinals, levator scapula, scalenes, supraspinatus     GIASTM upper trap, cervical paraspinals, levator scapula, scalenes Manual therapy:   R upper trap, cervical paraspinals, levator scapula, scalenes, supraspinatus     GIASTM upper trap, cervical paraspinals, levator scapula, scalenes Manual therapy:   R upper trap, cervical paraspinals, levator scapula, scalenes, supraspinatus     GIASTM upper trap, cervical paraspinals, levator scapula, scalenes Manual therapy:   R upper trap, cervical paraspinals, levator scapula, scalenes, supraspinatus , R AC joint and AC ligament    GIASTM upper trap, cervical paraspinals, levator scapula, scalenes Manual therapy:   R upper trap, cervical paraspinals, levator scapula, scalenes, supraspinatus , R AC joint and AC ligament    GIASTM upper trap, cervical paraspinals, levator scapula, scalenes, AC jt/ligament    AC gapping>clavicle gr II-III multiple bouts  Manual therapy:   R upper trap, cervical paraspinals, levator scapula,  scalenes, supraspinatus , R AC joint and AC ligament    GIASTM upper trap, cervical paraspinals, levator scapula, scalenes, AC jt/ligament    AC gapping>clavicle gr II-III multiple bouts       MHP R upper trap/cervical 5 min MHP R upper trap/cervical 2 min MHP R upper trap/cervical 2 min MHP R upper trap/cervical 2 min MHP R upper trap/cervical 2 min MHP R upper trap/cervical 2 min MHP -Not today                   HEP: scap retraction x10, pendulum x10-for pain, c-spine SB x3 20 sec, cervical rotation x10-w and wo towel, cane-flex x10, flexion up wall x10, abduction sitting w towel x10 BID    Charges: [place] 30', MT 15'        Total Timed Treatment: 45 min  Total Treatment Time: 45 min

## 2024-03-06 ENCOUNTER — OFFICE VISIT (OUTPATIENT)
Dept: PHYSICAL THERAPY | Facility: HOSPITAL | Age: 39
End: 2024-03-06
Attending: ORTHOPAEDIC SURGERY
Payer: OTHER MISCELLANEOUS

## 2024-03-06 PROCEDURE — 97112 NEUROMUSCULAR REEDUCATION: CPT | Performed by: PHYSICAL THERAPY ASSISTANT

## 2024-03-06 PROCEDURE — 97110 THERAPEUTIC EXERCISES: CPT | Performed by: PHYSICAL THERAPY ASSISTANT

## 2024-03-06 NOTE — PROGRESS NOTES
Diagnosis:   Neck pain (M54.2)   Strain of right shoulder, subsequent encounter (S42.916K)          Comments:  Neck pain with shoulder pain - 2-3 times per week for 2 weeks       Referring Provider: Ac Horn  Date of Evaluation:    1/25/2024    Precautions:  None      Neck Disability Index Score  No data recorded Next MD visit:   none scheduled  Date of Surgery: n/a   Insurance Primary/Secondary: [employer] COMP / N/A     # Auth Visits: 12          Visit 10 of 12     MRI of the right shoulder: mild AC joint arthritis and low grade subscapularis irregularity/tearing.  Intact remainder of rotator cuff tendons.  Type II acromion.     Subjective:   Pt states that he feels that the tape was supporting him more than he realized - tape to be reapplied today. Awaiting MD appt to hear if I can drive yet.     Pain:  3/10 upper trap      Objective: Tx flow sheet     Cervical AROM: (* denotes performed with pain)  Flexion: 50 (was:20-pain)  Extension: 60 (was:30-pain)  Sidebending: B 35(was:20-tight R side)  Rotation: B 65 (Was:R 30-stiff; L 35)    Shoulder AROM:standing  Flexion  R 125*(was:110*) L 145 - slouched sitting  Abduction R 160* (was:110*) L wnl  ER R 50* (was:40*) L 60, supine R 60  IR R T10* (was:T12*) L T6, supine R 50      Strength: (* denotes performed with pain)  UE/Scapular   Shoulder Flex: R 5/-5 (was:4-/5*), L 5/5  Shoulder ABD (C5): R 5-/5 (was:4-/5*), L 5/5  Biceps (C6): R 5/5, L 5/5  Wrist ext (C6): R 5/5, L 5/5  Triceps (C7): R 5/5, L 5/5  Wrist Flex (C7): R 5/5, L 5/5  Digit Flex (C8): R 5/5, L 5/5  Thumb Ext (C8): R 5/5, L 5/5  Interossei (T1): R 5/5, L 5/5    Rhomboids: R 5-/5 (was:4-/5*), L 5/5  Mid trap: R 5-/5 (was:4-/5*); L 5/5  Lats: R nt, L nt  Low trap: R 4--5*; L 5/5           Observation/Posture: FHP 15 degrees (was:25 degrees), 10 degrees upper cervical extensionrounded shoulders, MIN muscle guarding R Shoulder  SLOUCHED SITTING.      Accessory motion: WNL R lateral glides C4-6, NO mm  guarding w CPA C2-5, tenderness R AC joint  Palpation: tenderness and tension R upper/mid trap, scalenes, supraspinatus, levator scapula , min tenderness R AC joint and AC ligament    Assessment: Pt states increase in pain which he attributes to support from tape previously which he declined last session. Tape reapplied with subscapularis support. Pt continues to assume slumped position when he sits - re educated on importance of correct posture and shoulder placement for maximal performance of GHJ . Reports ongoing neck discomfort. Introduced new 90 / 90 IR / ER . Discussed final approaching approved session and agreed to aim  for more sessions. Tape applied at start of session prior to exercise and as a result no manual therapy today. Continued with IR / ER and periscapular control.  Pt reports improved comfort following tape application. Pt reported no adverse reaction in response to interventions today. PT remains necessary to address ongoing deficits as identified at initial assessment.  NV progress note      Goals:   Goals: (to be met in 10 visits) -progressing  Pt will improve cervical AROM flexion by 20 degrees to improve tolerance for looking down to tie shoes   Pt will improve cervical AROM extension by 20 degrees to improve tolerance for putting dishes into overhead cabinets   Pt will improve cervical AROM rotation to >20 degrees to improve tolerance for turning head to check blind spot while driving  Pt will improve postural strength (mid/low trap) to 5/5 to promote improved upright posturing and decreased pain with lifting   Pt will improve shoulder flexion AROM to >/=150 degrees to be able to reach into overhead cabinets without pain or restriction -MET END OF SESSION  Pt will improve shoulder abduction AROM to >/=150 degrees to improve ability to don deodorant, don/doff shirts, and wash hair -MET END OF SESSION  Pt will increase shoulder AROM ER to 90 to be able to reach and fasten seatbelt   Pt will  increase shoulder AROM IR to 70 to be able to reach in back pocket, tuck in shirt, and turn steering wheel without pain  Pt will improve shoulder strength throughout to 5/5 to improve function with lifting and reaching   Pt will be independent and compliant with comprehensive HEP to maintain progress achieved in PT      Plan: Continue plan of care as pt tolerates with focus on neck rehab. Next visit: Resume manual therapy as necessary.   Date:  2/15/2024   Tx#: 7/ Date: 2/21/2024   Tx#: 8/ 12 Date: 2/29/2024   Tx#: 9/12 Date:   Tx#: 10/ 12 Date: 3/6/2024   Tx#: 11/12 Date:   Tx#: 12/   Ther Ex:     Subj assessment      Scifit F/B 3 min ea L3    Wall wipes red ball x20 w chin tuck    ER YTB x10  IR YTB x10    Prone-  Scap retraction/depression+  Sh ext  Habd  Row  X10 ea    Scap retraction + depression cable 10# x20    B shoulder ext cable 10# x20    Thoracic rotation with cervical rotation cable 10# R/L x20    1/2 foam roller-standing  Alt shoulder flexion  B shoulder flexion  Scaption  1# x10    Thoracic rotation w habd R/L -hands on wall    R Upper trap stretch -  20 sec x3    KT R upper trap Ther Ex:     Subj assessment      Scifit F/B 3 min ea L3    Wall wipes red ball x20 w chin tuck    Wall push ups x15    ER Red spri 2 x10  IR Red spri 2x10    Prone-  Scap retraction/depression+  Sh ext  Habd  Row  X10 ea    Scap retraction + depression cable 10# x20    B shoulder ext cable 10# x20    Thoracic rotation with cervical rotation cable 10# R/L x20    1/2 foam roller-standing  Alt shoulder flexion  B shoulder flexion  Scaption  1# x10    Thoracic rotation w habd R/L -hands on wall    R Upper trap stretch -  20 sec x3    KT R supportive GH/AC jt Ther Ex:     Subj assessment      Assessed:  Cervical ROM  Shoulder ROM  Shoulder strength    Scifit F/B 3 min ea L3    Wall wipes red ball x20 w chin tuck    Wall push ups x15    Prone-  Scap retraction/depression+  Sh ext  Habd  Row  X10 ea    Scap retraction + depression  cable 10# x20    Precor IR/ER 10# 2x10    B shoulder ext cable 10# x20    Thoracic rotation with cervical rotation cable 10# R/L x20    1/2 foam roller-standing  Alt shoulder flexion--Not today   B shoulder flexion  Scaption  1# x10--Not today     Thoracic rotation w habd R/L -hands on wall    R Upper trap stretch -  20 sec x3    KT R supportive GH/AC jt Ther ex     Subj assessment      Scifit F/B 3 min ea L3    Prone-  Scap retraction/depression+  Sh ext  Habd  Row  Y x 10 NEW  X10 ea    Seated scap retraction to address postural defcits x 20    Standing , 1/2 FR, W retractions. X 10   UT stretch x 20 secs x 5   Standing with 1/2 FR 90 / 90 ER / IR with GTB resistance x 2 x 10 NEW    Standing thoracic rotation x 10     1/2 foam roller-standing  Alt shoulder flexion   B shoulder flexion  Scaption  1# x10  B shoulder ext cable 10# x20       Ther ex   Subj assessment     Scifit F/B 3 min ea L3    Seated scap retraction to address postural defcits x 20    90/90 IR / ER with GTB loop at precor 10# new  Resisted IR  / ER at precor  with Green TB loop  x 20   Prone Is x 15  Prone Ys x 15  Prone HA  x 15    NV - Progress note    Manual therapy:   R upper trap, cervical paraspinals, levator scapula, scalenes, supraspinatus , R AC joint and AC ligament    GIASTM upper trap, cervical paraspinals, levator scapula, scalenes Manual therapy:   R upper trap, cervical paraspinals, levator scapula, scalenes, supraspinatus , R AC joint and AC ligament    GIASTM upper trap, cervical paraspinals, levator scapula, scalenes, AC jt/ligament    AC gapping>clavicle gr II-III multiple bouts  Manual therapy:   R upper trap, cervical paraspinals, levator scapula, scalenes, supraspinatus , R AC joint and AC ligament    GIASTM upper trap, cervical paraspinals, levator scapula, scalenes, AC jt/ligament    AC gapping>clavicle gr II-III multiple bouts   NMR  Tape applied to support subscapularis and AC joint  Pt educated in purpose and removal of  tape       MHP R upper trap/cervical 2 min MHP R upper trap/cervical 2 min MHP -Not today               HEP: scap retraction x10, pendulum x10-for pain, c-spine SB x3 20 sec, cervical rotation x10-w and wo towel, cane-flex x10, flexion up wall x10, abduction sitting w towel x10 BID    Charges: Tex2  32 mins ', NMR x 1 ( 8 mins)       Total Timed Treatment: 40 min  Total Treatment Time: 40 min

## 2024-03-18 ENCOUNTER — TELEPHONE (OUTPATIENT)
Dept: PHYSICAL THERAPY | Facility: HOSPITAL | Age: 39
End: 2024-03-18

## 2024-03-18 ENCOUNTER — OFFICE VISIT (OUTPATIENT)
Dept: ORTHOPEDICS CLINIC | Facility: CLINIC | Age: 39
End: 2024-03-18
Payer: OTHER MISCELLANEOUS

## 2024-03-18 ENCOUNTER — OFFICE VISIT (OUTPATIENT)
Dept: PHYSICAL THERAPY | Facility: HOSPITAL | Age: 39
End: 2024-03-18
Attending: ORTHOPAEDIC SURGERY
Payer: OTHER MISCELLANEOUS

## 2024-03-18 DIAGNOSIS — Z98.890 S/P ARTHROSCOPY OF SHOULDER: Primary | ICD-10-CM

## 2024-03-18 PROCEDURE — 97140 MANUAL THERAPY 1/> REGIONS: CPT

## 2024-03-18 PROCEDURE — 97110 THERAPEUTIC EXERCISES: CPT

## 2024-03-18 NOTE — PROGRESS NOTES
[facility] - ORTHOPEDICS  [address] [place] [id number]  [phone]       Name: Jluis King   MRN: [id number]  Date: 3/18/2024     REASON FOR VISIT: Follow up for right AC joint pre-existing arthropathy and shoulder contusion sustained 12/17/23.     INTERVAL HISTORY:  Jluis King is a 38 year old male who returns for evaluation of right AC joint pre-existing arthropathy and shoulder contusion sustained 12/17/23. He has been in physical therapy, and Meloxicam 15mg. Previously saw Dr. Alanis, and has noted improvement. He has been working light duty. 1/10 pain.       ROS: ROS    PE:   There were no vitals filed for this visit.  Estimated body mass index is 29.03 kg/m² as calculated from the following:    Height as of 2/5/24: 6' 1\" (1.854 m).    Weight as of 2/5/24: 220 lb (99.8 kg).    Physical Exam  Constitutional:       Appearance: Normal appearance.   HENT:      Head: Normocephalic and atraumatic.   Eyes:      Extraocular Movements: Extraocular movements intact.   Neck:      Musculoskeletal: Normal range of motion and neck supple.   Cardiovascular:      Pulses: Normal pulses.   Pulmonary:      Effort: Pulmonary effort is normal. No respiratory distress.   Abdominal:      General: There is no distension.   Skin:     General: Skin is warm.      Capillary Refill: Capillary refill takes less than 2 seconds.      Findings: No bruising.   Neurological:      General: No focal deficit present.      Mental Status: She is alert.   Psychiatric:         Mood and Affect: Mood normal.       Examination of the right shoulder demonstrates:     Skin is intact, warm and dry.   Cervical:  Full ROM  Spurling's  Negative    Deformity:   none  Atrophy:   none    Scapular winging: Negative    Palpation:     AC Joint  Negative  Biceps Tendon  Positive  Greater Tuberosity Positive    ROM:   Forward Flexion:  150\'b0 active guarding   Abduction:   full and symmetric  External Rotation:  full and  symmetric  Internal Rotation:  full and symmetric    Rotator Cuff Strength:   Supraspinatus:   5/5  Subscapularis:   5/5  Infraspinatus/Teres: 5/5    Provocative Tests:   Lucero:   Negative  Speed's:   Negative  Sonoma's:   Negative  Lift-off:   Negative  Apprehension:  Negative  Sulcus Sign:   Negative    Neurovascular Upper Extremity (Bilateral)  Motor:    5/5 EPL, Finger Abduction, , Pinch, Deltoid  Sensation:   intact to light touch median, ulnar, radial and axillary nerve  Circulation:   Normal, 2+ radial pulse    The contralateral upper extremity is without limitation in range of motion or strength, no positive provocative maneuvers.     Radiology:   MRI of the right shoulder: mild AC joint arthritis and low grade subscapularis irregularity/tearing.  Intact remainder of rotator cuff tendons.  Type II acromion.     IMPRESSION: Jluis King is a 38 year old male who presented for follow up of right AC joint pre-existing arthropathy and shoulder contusion sustained 12/17/23.     PLAN:   We had a detailed discussion outlining the etiology, anatomy, pathophysiology, and natural history of the patient's findings.    We reviewed the treatment of this disease condition.      He continues to have limitations despite conservative treatment. He expresses worry regarding return to full duty work. He can return to work no pulling, pushing, or lifting greater than 2lbs until second opinion evaluation.     I recommend second opinion or [facility] for additional PT/ or full duty release.     He requires expert consultation.     The patient had the opportunity to ask questions, and all questions were answered appropriately.       FOLLOW-UP:  Return to clinic on an as needed basis.             William Kuhn, [facility], PA-C Orthopedic Surgery / Sports Medicine Specialist  [facility]  [address] [place] [address] [id number] [place] [email or web address]  [email or web address]  t: [phone]  o: [phone]  f:  [phone]    This note was dictated using Dragon software.  While it was briefly proofread prior to completion, some grammatical, spelling, and word choice errors due to dictation may still occur.

## 2024-03-18 NOTE — PROGRESS NOTES
Diagnosis:   Neck pain (M54.2)   Strain of right shoulder, subsequent encounter (S40.592U)          Comments:  Neck pain with shoulder pain - 2-3 times per week for 2 weeks       Referring Provider: Ac Horn  Date of Evaluation:    1/25/2024    Precautions:  None      Neck Disability Index Score  No data recorded Next MD visit:   none scheduled  Date of Surgery: n/a    12/27/2023-DOI   Insurance Primary/Secondary: WORKERS COMP / N/A     # Auth Visits: 22 4/24/2024           Progress note:  Pt seen 12 visits in physical therapy    MRI of the right shoulder: mild AC joint arthritis and low grade subscapularis irregularity/tearing.  Intact remainder of rotator cuff tendons.  Type II acromion.     Subjective:   Saw PA- released to go back to work. Restrictions: lifting   Driving restrictions have been lifted.  Tape seems to help but didn't shave upper shoulder so wants to defer tape today.   R shoulder feeling better. Having difficulty with raising arm overhead    Pain:  1/10 upper trap/clavicular region with shoulder flexion      Objective: Tx flow sheet     Cervical AROM: (* denotes performed with pain)  Flexion: 50-no pain (was:20-pain)  Extension: 60-no pain (was:30-pain)  Sidebending: B 35-no pain(was:20-tight R side)  Rotation: B 65-no pain (Was:R 30-stiff; L 35)    Shoulder AROM:standing  Flexion  R 140*(was:110*) L 145 - slouched sitting  Abduction R 150* (was:110*) L wnl  ER R 60* (was:40*) L 90  IR R T6 (was:T12*) L T6, supine R 50    PROM  Flexion R 145*  Abduction 160*      Strength: (* denotes performed with pain)  UE/Scapular   Shoulder Flex: R 5/5 (was:4-/5*), L 5/5  Shoulder ABD (C5): R 5/5 (was:4-/5*), L 5/5  Biceps (C6): R 5/5, L 5/5  Wrist ext (C6): R 5/5, L 5/5  Triceps (C7): R 5/5, L 5/5  Wrist Flex (C7): R 5/5, L 5/5  Digit Flex (C8): R 5/5, L 5/5  Thumb Ext (C8): R 5/5, L 5/5  Interossei (T1): R 5/5, L 5/5    Rhomboids: R 5-/5 (was:4-/5*), L 5/5  Mid trap: R  n5-/5 (was:4-/5*); L  5/5  Lats: R nt, L nt  Low trap: R 5-/5 (was:4-/5*); L 5/5           Observation/Posture: FHP 15 degrees (was:25 degrees), 10 degrees upper cervical extensionrounded shoulders, MIN muscle guarding R Shoulder  SLOUCHED SITTING.      Accessory motion: WNL R lateral glides C4-6, NO mm guarding w CPA C2-5, tenderness R clavicular region with shoulder flexion that reduces with manual posterior rotation of clavicle with active shoulder flexion    Palpation: MIN tenderness and tension R upper/mid trap,  MIN tenderness R AC joint and AC ligament    Assessment:   Pt has made significant improvement in physical therapy with increased ROM, strength, and function with raising arm in all directions. Flexion continues to be most aggravating motion that improved in ROM and reduction of discomfort after clavicle mobes into backward rotation. Goals partially met. Pt motivated to continue HEP.  No tape today secondary to amount of hair growth.Pt continues to assume slumped position when he sits - re educated on importance of correct posture and shoulder placement for maximal performance of GHJ . No neck pain. Continued with IR / ER and periscapular control.  Pt reported no adverse reaction in response to interventions today. PT remains necessary to address ongoing deficits as identified at initial assessment.    Pt would benefit from continued skilled physical therapy to address problems and to meet goals as outlined below.       Goals:   Goals: (to be met in 10 visits) -progressing  Pt will improve cervical AROM flexion by 20 degrees to improve tolerance for looking down to tie shoes   Pt will improve cervical AROM extension by 20 degrees to improve tolerance for putting dishes into overhead cabinets   Pt will improve cervical AROM rotation to >20 degrees to improve tolerance for turning head to check blind spot while driving  Pt will improve postural strength (mid/low trap) to 5/5 to promote improved upright posturing and decreased  pain with lifting   Pt will improve shoulder flexion AROM to >/=150 degrees to be able to reach into overhead cabinets without pain or restriction -MET END OF SESSION  Pt will improve shoulder abduction AROM to >/=150 degrees to improve ability to don deodorant, don/doff shirts, and wash hair -MET END OF SESSION  Pt will increase shoulder AROM ER to 90 to be able to reach and fasten seatbelt   Pt will increase shoulder AROM IR to 70 to be able to reach in back pocket, tuck in shirt, and turn steering wheel without pain  Pt will improve shoulder strength throughout to 5/5 to improve function with lifting and reaching   Pt will be independent and compliant with comprehensive HEP to maintain progress achieved in PT      Plan: Continue plan of care 2x/week for 12 more visits. Therapeutic exercises to include: ROM, stretching, strengthening, HEP, posture education/training, scapular and core stabilization.  Manual therapy to include: joint mobilizations to restore normal joint mechanics and decrease pain, instrument assisted soft tissue mobilization. Modalities: e-stim, heat/cold for pain relief.   Date:  2/15/2024   Tx#: 7/ Date: 2/21/2024   Tx#: 8/ 12 Date: 2/29/2024   Tx#: 9/12 Date: 3/4/2024  Tx#: 10/ 12 Date: 3/6/2024   Tx#: 11/12 Date: 3/18/2024   Tx#: 12/   Ther Ex:     Subj assessment      Scifit F/B 3 min ea L3    Wall wipes red ball x20 w chin tuck    ER YTB x10  IR YTB x10    Prone-  Scap retraction/depression+  Sh ext  Habd  Row  X10 ea    Scap retraction + depression cable 10# x20    B shoulder ext cable 10# x20    Thoracic rotation with cervical rotation cable 10# R/L x20    1/2 foam roller-standing  Alt shoulder flexion  B shoulder flexion  Scaption  1# x10    Thoracic rotation w habd R/L -hands on wall    R Upper trap stretch -  20 sec x3    KT R upper trap Ther Ex:     Subj assessment      Scifit F/B 3 min ea L3    Wall wipes red ball x20 w chin tuck    Wall push ups x15    ER Red spri 2 x10  IR Red spri  2x10    Prone-  Scap retraction/depression+  Sh ext  Habd  Row  X10 ea    Scap retraction + depression cable 10# x20    B shoulder ext cable 10# x20    Thoracic rotation with cervical rotation cable 10# R/L x20    1/2 foam roller-standing  Alt shoulder flexion  B shoulder flexion  Scaption  1# x10    Thoracic rotation w habd R/L -hands on wall    R Upper trap stretch -  20 sec x3    KT R supportive GH/AC jt Ther Ex:     Subj assessment      Assessed:  Cervical ROM  Shoulder ROM  Shoulder strength    Scifit F/B 3 min ea L3    Wall wipes red ball x20 w chin tuck    Wall push ups x15    Prone-  Scap retraction/depression+  Sh ext  Habd  Row  X10 ea    Scap retraction + depression cable 10# x20    Precor IR/ER 10# 2x10    B shoulder ext cable 10# x20    Thoracic rotation with cervical rotation cable 10# R/L x20    1/2 foam roller-standing  Alt shoulder flexion--Not today   B shoulder flexion  Scaption  1# x10--Not today     Thoracic rotation w habd R/L -hands on wall    R Upper trap stretch -  20 sec x3    KT R supportive GH/AC jt Ther ex     Subj assessment      Scifit F/B 3 min ea L3    Prone-  Scap retraction/depression+  Sh ext  Habd  Row  Y x 10 NEW  X10 ea    Seated scap retraction to address postural defcits x 20    Standing , 1/2 FR, W retractions. X 10   UT stretch x 20 secs x 5   Standing with 1/2 FR 90 / 90 ER / IR with GTB resistance x 2 x 10 NEW    Standing thoracic rotation x 10     1/2 foam roller-standing  Alt shoulder flexion   B shoulder flexion  Scaption  1# x10  B shoulder ext cable 10# x20       Ther ex   Subj assessment     Scifit F/B 3 min ea L3    Seated scap retraction to address postural defcits x 20    90/90 IR / ER with GTB loop at precor 10# new  Resisted IR  / ER at precor  with Green TB loop  x 20   Prone Is x 15  Prone Ys x 15  Prone HA  x 15    Ther Ex:   Subj assessment      Progress note      Scifit F/B 3 min ea L3- hills    Precor-  Scap retraction+row 10# 2x10    Precor-  IR 10#  2x15  ER 10# 2x15    90/90 IR / ER with GTB 3x10    Prone Is x 15 1#  Prone Ys x 15 1#  Prone HA  x 15 1#  Row x 15 2#    Cane 2# x10    Manual therapy:   R upper trap, cervical paraspinals, levator scapula, scalenes, supraspinatus , R AC joint and AC ligament    GIASTM upper trap, cervical paraspinals, levator scapula, scalenes Manual therapy:   R upper trap, cervical paraspinals, levator scapula, scalenes, supraspinatus , R AC joint and AC ligament    GIASTM upper trap, cervical paraspinals, levator scapula, scalenes, AC jt/ligament    AC gapping>clavicle gr II-III multiple bouts  Manual therapy:   R upper trap, cervical paraspinals, levator scapula, scalenes, supraspinatus , R AC joint and AC ligament    GIASTM upper trap, cervical paraspinals, levator scapula, scalenes, AC jt/ligament    AC gapping>clavicle gr II-III multiple bouts   NMR  Tape applied to support subscapularis and AC joint  Pt educated in purpose and removal of tape    MT  Clavicle jt mobes BW rotation  + w flexion in sitting  + grey theraband (HEP)    Posture ed     MHP R upper trap/cervical 2 min MHP R upper trap/cervical 2 min MHP -Not today               HEP: scap retraction x10, pendulum x10-for pain, c-spine SB x3 20 sec, cervical rotation x10-w and wo towel, cane-flex x10, flexion up wall x10, abduction sitting w towel x10 BID    Charges: Tex2  32 mins ', NMR x 1 13'       Total Timed Treatment: 45 min  Total Treatment Time: 45min

## 2024-03-21 ENCOUNTER — OFFICE VISIT (OUTPATIENT)
Dept: PHYSICAL THERAPY | Facility: HOSPITAL | Age: 39
End: 2024-03-21
Attending: PREVENTIVE MEDICINE
Payer: OTHER MISCELLANEOUS

## 2024-03-21 PROCEDURE — 97110 THERAPEUTIC EXERCISES: CPT

## 2024-03-21 PROCEDURE — 97112 NEUROMUSCULAR REEDUCATION: CPT

## 2024-03-21 NOTE — PROGRESS NOTES
Diagnosis:   Neck pain (M54.2)   Strain of right shoulder, subsequent encounter (S46.237M)          Comments:  Neck pain with shoulder pain - 2-3 times per week for 2 weeks       Referring Provider: Ac Horn  Date of Evaluation:    1/25/2024    Precautions:  None      Neck Disability Index Score  No data recorded Next MD visit:   none scheduled  Date of Surgery: n/a    12/27/2023-DOI   Insurance Primary/Secondary: [employer] / N/A     # Auth Visits: 22 4/24/2024           MRI of the right shoulder: mild AC joint arthritis and low grade subscapularis irregularity/tearing.  Intact remainder of rotator cuff tendons.  Type II acromion.     Subjective:   Wants tape today-shaved top of shoulder.  Went back to work-lite duty yesterday.    Pain:  1/10 upper trap/clavicular region with shoulder flexion      Objective: Tx flow sheet     Cervical AROM: (* denotes performed with pain)  Flexion: 50-no pain (was:20-pain)  Extension: 60-no pain (was:30-pain)  Sidebending: B 35-no pain(was:20-tight R side)  Rotation: B 65-no pain (Was:R 30-stiff; L 35)    Shoulder AROM:standing  Flexion  R 140*(was:110*) L 145 - slouched sitting   Abduction R 150* (was:110*) L wnl  ER R 60* (was:40*) L 90  IR R T6 (was:T12*) L T6, supine R 50    PROM  Flexion R 145*  Abduction 160*      Strength: (* denotes performed with pain)  UE/Scapular   Shoulder Flex: R 5/5 (was:4-/5*), L 5/5  Shoulder ABD (C5): R 5/5 (was:4-/5*), L 5/5  Biceps (C6): R 5/5, L 5/5  Wrist ext (C6): R 5/5, L 5/5  Triceps (C7): R 5/5, L 5/5  Wrist Flex (C7): R 5/5, L 5/5  Digit Flex (C8): R 5/5, L 5/5  Thumb Ext (C8): R 5/5, L 5/5  Interossei (T1): R 5/5, L 5/5    Rhomboids: R 5-/5 (was:4-/5*), L 5/5  Mid trap: R  n5-/5 (was:4-/5*); L 5/5  Lats: R nt, L nt  Low trap: R 5-/5 (was:4-/5*); L 5/5           Observation/Posture: FHP 15 degrees (was:25 degrees), 10 degrees upper cervical extensionrounded shoulders, MIN muscle guarding R Shoulder  SLOUCHED  SITTING.      Accessory motion: WNL R lateral glides C4-6, NO mm guarding w CPA C2-5, tenderness R clavicular region with shoulder flexion that reduces with manual posterior rotation of clavicle with active shoulder flexion    Palpation: MIN tenderness and tension R upper/mid trap,  MIN tenderness R AC joint and AC ligament    Assessment:   Pt able to perform full, active flexion with no pain after R AC jt mobes-anterior rotation clavicle.  Taping to promote this position.   RC exercises and range of motion with minimal difficulty. Increased weights and progressed scapular strengthening exs.        Goals:   Goals: (to be met in 10 visits) -progressing  Pt will improve cervical AROM flexion by 20 degrees to improve tolerance for looking down to tie shoes   Pt will improve cervical AROM extension by 20 degrees to improve tolerance for putting dishes into overhead cabinets   Pt will improve cervical AROM rotation to >20 degrees to improve tolerance for turning head to check blind spot while driving  Pt will improve postural strength (mid/low trap) to 5/5 to promote improved upright posturing and decreased pain with lifting   Pt will improve shoulder flexion AROM to >/=150 degrees to be able to reach into overhead cabinets without pain or restriction -MET END OF SESSION  Pt will improve shoulder abduction AROM to >/=150 degrees to improve ability to don deodorant, don/doff shirts, and wash hair -MET END OF SESSION  Pt will increase shoulder AROM ER to 90 to be able to reach and fasten seatbelt   Pt will increase shoulder AROM IR to 70 to be able to reach in back pocket, tuck in shirt, and turn steering wheel without pain  Pt will improve shoulder strength throughout to 5/5 to improve function with lifting and reaching   Pt will be independent and compliant with comprehensive HEP to maintain progress achieved in PT      Plan: Continue plan of care 2x/week for 12 more visits. Therapeutic exercises to include: ROM,  stretching, strengthening, HEP, posture education/training, scapular and core stabilization.  Manual therapy to include: joint mobilizations to restore normal joint mechanics and decrease pain, instrument assisted soft tissue mobilization. Modalities: e-stim, heat/cold for pain relief.   Date:  2/15/2024   Tx#: 7/ Date: 2/21/2024   Tx#: 8/ 12 Date: 2/29/2024   Tx#: 9/12 Date: 3/4/2024  Tx#: 10/ 12 Date: 3/6/2024   Tx#: 11/12 Date: 3/18/2024   Tx#: 12/ Date: 3/21/2024   Tx#: 13   Ther Ex:     Subj assessment      Scifit F/B 3 min ea L3    Wall wipes red ball x20 w chin tuck    ER YTB x10  IR YTB x10    Prone-  Scap retraction/depression+  Sh ext  Habd  Row  X10 ea    Scap retraction + depression cable 10# x20    B shoulder ext cable 10# x20    Thoracic rotation with cervical rotation cable 10# R/L x20    1/2 foam roller-standing  Alt shoulder flexion  B shoulder flexion  Scaption  1# x10    Thoracic rotation w habd R/L -hands on wall    R Upper trap stretch -  20 sec x3    KT R upper trap Ther Ex:     Subj assessment      Scifit F/B 3 min ea L3    Wall wipes red ball x20 w chin tuck    Wall push ups x15    ER Red spri 2 x10  IR Red spri 2x10    Prone-  Scap retraction/depression+  Sh ext  Habd  Row  X10 ea    Scap retraction + depression cable 10# x20    B shoulder ext cable 10# x20    Thoracic rotation with cervical rotation cable 10# R/L x20    1/2 foam roller-standing  Alt shoulder flexion  B shoulder flexion  Scaption  1# x10    Thoracic rotation w habd R/L -hands on wall    R Upper trap stretch -  20 sec x3    KT R supportive GH/AC jt Ther Ex:     Subj assessment      Assessed:  Cervical ROM  Shoulder ROM  Shoulder strength    Scifit F/B 3 min ea L3    Wall wipes red ball x20 w chin tuck    Wall push ups x15    Prone-  Scap retraction/depression+  Sh ext  Habd  Row  X10 ea    Scap retraction + depression cable 10# x20    Precor IR/ER 10# 2x10    B shoulder ext cable 10# x20    Thoracic rotation with cervical  rotation cable 10# R/L x20    1/2 foam roller-standing  Alt shoulder flexion--Not today   B shoulder flexion  Scaption  1# x10--Not today     Thoracic rotation w habd R/L -hands on wall    R Upper trap stretch -  20 sec x3    KT R supportive GH/AC jt Ther ex     Subj assessment      Scifit F/B 3 min ea L3    Prone-  Scap retraction/depression+  Sh ext  Habd  Row  Y x 10 NEW  X10 ea    Seated scap retraction to address postural defcits x 20    Standing , 1/2 FR, W retractions. X 10   UT stretch x 20 secs x 5   Standing with 1/2 FR 90 / 90 ER / IR with GTB resistance x 2 x 10 NEW    Standing thoracic rotation x 10     1/2 foam roller-standing  Alt shoulder flexion   B shoulder flexion  Scaption  1# x10  B shoulder ext cable 10# x20       Ther ex/NMR   Subj assessment     Scifit F/B 3 min ea L3    Seated scap retraction to address postural defcits x 20    90/90 IR / ER with GTB loop at precor 10# new  Resisted IR  / ER at precor  with Green TB loop  x 20   Prone Is x 15  Prone Ys x 15  Prone HA  x 15    Ther Ex/NMR:   Subj assessment      Progress note      Scifit F/B 3 min ea L3- hills    Precor-  Scap retraction+row 10# 2x10    Precor-  IR 10# 2x15  ER 10# 2x15    90/90 IR / ER with GTB 3x10    Prone Is x 15 1#  Prone Ys x 15 1#  Prone HA  x 15 1#  Row x 15 2#    Cane 2# x10 flexion Ther Ex/NMR:   Subj assessment       Scifit F/B 3 min ea L3- hills    Triceps extension RTB 2x10  B sh ext RTB 2x10    ER red spri 2x10  IR red spri 2x10    Precor-  Scap retraction+row 10# 2x10    Precor-  IR 10# 2x15  ER 10# 2x15    90/90 IR / ER with GTB 3x10    SL-ER 2# x20  SL-scaption 2# 2x10    Prone Is x 15 3#  Prone Ys x 15 3#  Prone HA  x 15 3#  Row x 15 2#    Snow angels x10    Wall push ups x10    Wall lift offs 2x10    KT-R shoulder supportive    functional manual release w anterior clavicle mobe+shoulder flexion       Manual therapy:   R upper trap, cervical paraspinals, levator scapula, scalenes, supraspinatus , R AC joint  and AC ligament    GIASTM upper trap, cervical paraspinals, levator scapula, scalenes Manual therapy:   R upper trap, cervical paraspinals, levator scapula, scalenes, supraspinatus , R AC joint and AC ligament    GIASTM upper trap, cervical paraspinals, levator scapula, scalenes, AC jt/ligament    AC gapping>clavicle gr II-III multiple bouts  Manual therapy:   R upper trap, cervical paraspinals, levator scapula, scalenes, supraspinatus , R AC joint and AC ligament    GIASTM upper trap, cervical paraspinals, levator scapula, scalenes, AC jt/ligament    AC gapping>clavicle gr II-III multiple bouts   NMR  Tape applied to support subscapularis and AC joint  Pt educated in purpose and removal of tape    MT  Clavicle jt mobes BW rotation  + w flexion in sitting  + grey theraband (HEP)    Posture ed   MT    Clavicle jt mobes BW rotation  -min   MHP R upper trap/cervical 2 min MHP R upper trap/cervical 2 min MHP -Not today                 HEP: scap retraction x10, pendulum x10-for pain, c-spine SB x3 20 sec, cervical rotation x10-w and wo towel, cane-flex x10, flexion up wall x10, abduction sitting w towel x10 BID    Charges: Tex2  32 mins ', NMR x 1 13'       Total Timed Treatment: 45 min  Total Treatment Time: 45min

## 2024-03-25 ENCOUNTER — APPOINTMENT (OUTPATIENT)
Dept: PHYSICAL THERAPY | Facility: HOSPITAL | Age: 39
End: 2024-03-25
Attending: PREVENTIVE MEDICINE
Payer: OTHER MISCELLANEOUS

## 2024-03-26 ENCOUNTER — TELEPHONE (OUTPATIENT)
Dept: PHYSICAL THERAPY | Facility: HOSPITAL | Age: 39
End: 2024-03-26

## 2024-03-27 ENCOUNTER — OFFICE VISIT (OUTPATIENT)
Dept: PHYSICAL THERAPY | Facility: HOSPITAL | Age: 39
End: 2024-03-27
Attending: PREVENTIVE MEDICINE
Payer: OTHER MISCELLANEOUS

## 2024-03-27 PROCEDURE — 97110 THERAPEUTIC EXERCISES: CPT

## 2024-03-27 PROCEDURE — 97112 NEUROMUSCULAR REEDUCATION: CPT

## 2024-03-27 NOTE — PROGRESS NOTES
Diagnosis:   Neck pain (M54.2)   Strain of right shoulder, subsequent encounter (S44.379U)          Comments:  Neck pain with shoulder pain - 2-3 times per week for 2 weeks       Referring Provider: Ac Horn  Date of Evaluation:    1/25/2024    Precautions:  None      Neck Disability Index Score  No data recorded Next MD visit:   none scheduled  Date of Surgery: n/a    12/27/2023-DOI   Insurance Primary/Secondary: [employer] / N/A     # Auth Visits: 22 4/24/2024           MRI of the right shoulder: mild AC joint arthritis and low grade subscapularis irregularity/tearing.  Intact remainder of rotator cuff tendons.  Type II acromion.     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    Pain:  Less than 1/10 upper trap/clavicular region with shoulder flexion      Objective: Tx flow sheet     Cervical AROM: (* denotes performed with pain)  Flexion: 50-no pain (was:20-pain)  Extension: 60-no pain (was:30-pain)  Sidebending: B 35-no pain(was:20-tight R side)  Rotation: B 65-no pain (Was:R 30-stiff; L 35)    Shoulder AROM:standing  Flexion  R 140*(was:110*) L 145 - slouched sitting   Abduction R 150* (was:110*) L wnl  ER R 60* (was:40*) L 90  IR R T6 (was:T12*) L T6, supine R 50    PROM  Flexion R 145*  Abduction 160*      Strength: (* denotes performed with pain)  UE/Scapular   Shoulder Flex: R 5/5 (was:4-/5*), L 5/5  Shoulder ABD (C5): R 5/5 (was:4-/5*), L 5/5  Biceps (C6): R 5/5, L 5/5  Wrist ext (C6): R 5/5, L 5/5  Triceps (C7): R 5/5, L 5/5  Wrist Flex (C7): R 5/5, L 5/5  Digit Flex (C8): R 5/5, L 5/5  Thumb Ext (C8): R 5/5, L 5/5  Interossei (T1): R 5/5, L 5/5    Rhomboids: R 5-/5 (was:4-/5*), L 5/5  Mid trap: R  n5-/5 (was:4-/5*); L 5/5  Lats: R nt, L nt  Low trap: R 5-/5 (was:4-/5*); L 5/5           Observation/Posture: FHP 15 degrees (was:25 degrees), 10 degrees upper cervical extensionrounded shoulders, MIN muscle guarding R Shoulder  SLOUCHED SITTING.      Accessory motion: WNL R lateral glides  C4-6, NO mm guarding w CPA C2-5, tenderness R clavicular region with shoulder flexion that reduces with manual posterior rotation of clavicle with active shoulder flexion    Palpation: MIN tenderness and tension R upper/mid trap,  MIN tenderness R AC joint and AC ligament    Assessment:   Pt able to perform full, active flexion with no pain after R AC jt mobes-anterior rotation clavicle.    Pt tolerated increase weight with strengthening exs without c/o pain except some difficulty/discomfort with habd for mid trap strengthening that lessened with change of shoulder position..  Postural corrections throughout as Jluis tends to slouch.        Goals:   Goals: (to be met in 10 visits) -progressing  Pt will improve cervical AROM flexion by 20 degrees to improve tolerance for looking down to tie shoes   Pt will improve cervical AROM extension by 20 degrees to improve tolerance for putting dishes into overhead cabinets   Pt will improve cervical AROM rotation to >20 degrees to improve tolerance for turning head to check blind spot while driving  Pt will improve postural strength (mid/low trap) to 5/5 to promote improved upright posturing and decreased pain with lifting   Pt will improve shoulder flexion AROM to >/=150 degrees to be able to reach into overhead cabinets without pain or restriction -MET END OF SESSION  Pt will improve shoulder abduction AROM to >/=150 degrees to improve ability to don deodorant, don/doff shirts, and wash hair -MET END OF SESSION  Pt will increase shoulder AROM ER to 90 to be able to reach and fasten seatbelt   Pt will increase shoulder AROM IR to 70 to be able to reach in back pocket, tuck in shirt, and turn steering wheel without pain  Pt will improve shoulder strength throughout to 5/5 to improve function with lifting and reaching   Pt will be independent and compliant with comprehensive HEP to maintain progress achieved in PT      Plan: Continue plan of care 2x/week for 12 more visits.  Therapeutic exercises to include: ROM, stretching, strengthening, HEP, posture education/training, scapular and core stabilization.  Manual therapy to include: joint mobilizations to restore normal joint mechanics and decrease pain, instrument assisted soft tissue mobilization. Modalities: e-stim, heat/cold for pain relief.   Date:  2/15/2024   Tx#: 7/ Date: 2/21/2024   Tx#: 8/ 12 Date: 2/29/2024   Tx#: 9/12 Date: 3/4/2024  Tx#: 10/ 12 Date: 3/6/2024   Tx#: 11/12 Date: 3/18/2024   Tx#: 12/ Date: 3/21/2024   Tx#: 13 Date: 3/27/2024   Tx#: 14   Ther Ex:     Subj assessment      Scifit F/B 3 min ea L3    Wall wipes red ball x20 w chin tuck    ER YTB x10  IR YTB x10    Prone-  Scap retraction/depression+  Sh ext  Habd  Row  X10 ea    Scap retraction + depression cable 10# x20    B shoulder ext cable 10# x20    Thoracic rotation with cervical rotation cable 10# R/L x20    1/2 foam roller-standing  Alt shoulder flexion  B shoulder flexion  Scaption  1# x10    Thoracic rotation w habd R/L -hands on wall    R Upper trap stretch -  20 sec x3    KT R upper trap Ther Ex:     Subj assessment      Scifit F/B 3 min ea L3    Wall wipes red ball x20 w chin tuck    Wall push ups x15    ER Red spri 2 x10  IR Red spri 2x10    Prone-  Scap retraction/depression+  Sh ext  Habd  Row  X10 ea    Scap retraction + depression cable 10# x20    B shoulder ext cable 10# x20    Thoracic rotation with cervical rotation cable 10# R/L x20    1/2 foam roller-standing  Alt shoulder flexion  B shoulder flexion  Scaption  1# x10    Thoracic rotation w habd R/L -hands on wall    R Upper trap stretch -  20 sec x3    KT R supportive GH/AC jt Ther Ex:     Subj assessment      Assessed:  Cervical ROM  Shoulder ROM  Shoulder strength    Scifit F/B 3 min ea L3    Wall wipes red ball x20 w chin tuck    Wall push ups x15    Prone-  Scap retraction/depression+  Sh ext  Habd  Row  X10 ea    Scap retraction + depression cable 10# x20    Precor IR/ER 10# 2x10    B  shoulder ext cable 10# x20    Thoracic rotation with cervical rotation cable 10# R/L x20    1/2 foam roller-standing  Alt shoulder flexion--Not today   B shoulder flexion  Scaption  1# x10--Not today     Thoracic rotation w habd R/L -hands on wall    R Upper trap stretch -  20 sec x3    KT R supportive GH/AC jt Ther ex     Subj assessment      Scifit F/B 3 min ea L3    Prone-  Scap retraction/depression+  Sh ext  Habd  Row  Y x 10 NEW  X10 ea    Seated scap retraction to address postural defcits x 20    Standing , 1/2 FR, W retractions. X 10   UT stretch x 20 secs x 5   Standing with 1/2 FR 90 / 90 ER / IR with GTB resistance x 2 x 10 NEW    Standing thoracic rotation x 10     1/2 foam roller-standing  Alt shoulder flexion   B shoulder flexion  Scaption  1# x10  B shoulder ext cable 10# x20       Ther ex/NMR   Subj assessment     Scifit F/B 3 min ea L3    Seated scap retraction to address postural defcits x 20    90/90 IR / ER with GTB loop at precor 10# new  Resisted IR  / ER at precor  with Green TB loop  x 20   Prone Is x 15  Prone Ys x 15  Prone HA  x 15    Ther Ex/NMR:   Subj assessment      Progress note      Scifit F/B 3 min ea L3- hills    Precor-  Scap retraction+row 10# 2x10        90/90 IR / ER with GTB 3x10    Prone Is x 15 1#  Prone Ys x 15 1#  Prone HA  x 15 1#  Row x 15 2#    Cane 2# x10 flexion Ther Ex/NMR:   Subj assessment       Scifit F/B 3 min ea L3- hills    Triceps extension RTB 2x10  B sh ext RTB 2x10    ER red spri 2x10  IR red spri 2x10    Precor-  Scap retraction+row 10# 2x10    Precor-  IR 10# 2x15  ER 10# 2x15    90/90 IR / ER with GTB 3x10    SL-ER 2# x20  SL-scaption 2# 2x10    Prone Is x 15 3#  Prone Ys x 15 3#  Prone HA  x 15 3#  Row x 15 2#    Snow angels x10    Wall push ups x10    Wall lift offs 2x10    KT-R shoulder supportive    functional manual release w anterior clavicle mobe+shoulder flexion     Ther Ex/NMR:   Subj assessment       Scifit F/B 3 min ea L3- hills    Biceps curl  8# 2x10    Triceps extension precor 15#  2x10    Precor-  IR 10# 2x15  ER 10# 2x15    Precor-  Scap retraction+row 10# 2x15  Shoulder ext 10# 2x15    Precor-  IR 10# 2x15  ER 10# 2x15    Prone Is x 15 3#  Prone Ys x 15 3#  Prone HA  x 15 3#  Row x 15 2#    Snow angels x10    Wall push ups x10    Wall lift offs 2x10    Side step wall RTB R/L x1'    functional manual release w anterior clavicle mobe+shoulder flexion   Manual therapy:   R upper trap, cervical paraspinals, levator scapula, scalenes, supraspinatus , R AC joint and AC ligament    GIASTM upper trap, cervical paraspinals, levator scapula, scalenes Manual therapy:   R upper trap, cervical paraspinals, levator scapula, scalenes, supraspinatus , R AC joint and AC ligament    GIASTM upper trap, cervical paraspinals, levator scapula, scalenes, AC jt/ligament    AC gapping>clavicle gr II-III multiple bouts  Manual therapy:   R upper trap, cervical paraspinals, levator scapula, scalenes, supraspinatus , R AC joint and AC ligament    GIASTM upper trap, cervical paraspinals, levator scapula, scalenes, AC jt/ligament    AC gapping>clavicle gr II-III multiple bouts   NMR  Tape applied to support subscapularis and AC joint  Pt educated in purpose and removal of tape    MT  Clavicle jt mobes BW rotation  + w flexion in sitting  + grey theraband (HEP)    Posture ed   MT    Clavicle jt mobes BW rotation  -min MT    Clavicle jt mobes BW rotation  -min   MHP R upper trap/cervical 2 min MHP R upper trap/cervical 2 min MHP -Not today                   HEP: scap retraction x10, pendulum x10-for pain, c-spine SB x3 20 sec, cervical rotation x10-w and wo towel, cane-flex x10, flexion up wall x10, abduction sitting w towel x10 BID    Charges: Tex2  32 mins ', NMR x 1 13'       Total Timed Treatment: 45 min  Total Treatment Time: 45min

## 2024-04-01 ENCOUNTER — OFFICE VISIT (OUTPATIENT)
Dept: PHYSICAL THERAPY | Facility: HOSPITAL | Age: 39
End: 2024-04-01
Attending: PREVENTIVE MEDICINE
Payer: OTHER MISCELLANEOUS

## 2024-04-01 PROCEDURE — 97110 THERAPEUTIC EXERCISES: CPT

## 2024-04-01 PROCEDURE — 97112 NEUROMUSCULAR REEDUCATION: CPT

## 2024-04-01 NOTE — PROGRESS NOTES
Diagnosis:   Neck pain (M54.2)   Strain of right shoulder, subsequent encounter (S40.344X)          Comments:  Neck pain with shoulder pain - 2-3 times per week for 2 weeks       Referring Provider: Ac Horn  Date of Evaluation:    1/25/2024    Precautions:  None      Neck Disability Index Score  No data recorded Next MD visit:   none scheduled  Date of Surgery: n/a    12/27/2023-DOI   Insurance Primary/Secondary: [employer] COMP / N/A     # Auth Visits: 22 4/24/2024           MRI of the right shoulder: mild AC joint arthritis and low grade subscapularis irregularity/tearing.  Intact remainder of rotator cuff tendons.  Type II acromion.     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  Able to reach overhead without pain.   No pain with neck movements. Feels some tightness R upper trap with L sidebending.    Pain:  Less than 1/10 upper trap/clavicular region with shoulder flexion      Objective: Tx flow sheet     Cervical AROM: (* denotes performed with pain)  Flexion: 50-no pain (was:20-pain)  Extension: 60-no pain (was:30-pain)  Sidebending: B 35-no pain, tightness L sidebending (was:20-tight R side)  Rotation: B 65-no pain (Was:R 30-stiff; L 35)    Shoulder AROM:standing  Flexion  R 150(was:110*)   Abduction R 150* (was:110*)   ER R 90 (was:40*) L 90  IR R T6 (was:T12*) L T6, supine R 65    PROM  Flexion R 160  Abduction 170      Strength: (* denotes performed with pain)  UE/Scapular   Shoulder Flex: R 5/5 (was:4-/5*), L 5/5  Shoulder ABD (C5): R 5/5 (was:4-/5*), L 5/5  Biceps (C6): R 5/5, L 5/5  Wrist ext (C6): R 5/5, L 5/5  Triceps (C7): R 5/5, L 5/5  Wrist Flex (C7): R 5/5, L 5/5  Digit Flex (C8): R 5/5, L 5/5  Thumb Ext (C8): R 5/5, L 5/5  Interossei (T1): R 5/5, L 5/5    Rhomboids: R 5-/5 (was:4-/5*), L 5/5  Mid trap: R  n5-/5 (was:4-/5*); L 5/5  Lats: R nt, L nt  Low trap: R 5-/5 (was:4-/5*); L 5/5           Observation/Posture: FHP 15 degrees (was:25 degrees), 10 degrees upper cervical  extensionrounded shoulders, MIN muscle guarding R Shoulder  SLOUCHED SITTING.      Accessory motion: WNL R lateral glides C4-6, NO mm guarding w CPA C2-5, tenderness R clavicular region with shoulder flexion that reduces with manual posterior rotation of clavicle with active shoulder flexion    Palpation: MIN tenderness and tension R upper/mid trap,  MIN tenderness R AC joint and AC ligament    Assessment:   Pt able to perform full, active flexion with no pain after R AC jt mobes-anterior rotation clavicle.    R shoulder ROM wnl.   Cervical range of motion wnl with only min tightness L upper trap. Pt instructed to do upper trap stretches.  Postural corrections throughout as Jluis tends to slouch.  Goals progressing as expected.       Goals:   Goals: (to be met in 10 visits) -progressing  Pt will improve cervical AROM flexion by 20 degrees to improve tolerance for looking down to tie shoes -MET  Pt will improve cervical AROM extension by 20 degrees to improve tolerance for putting dishes into overhead cabinets -MET  Pt will improve cervical AROM rotation to >20 degrees to improve tolerance for turning head to check blind spot while driving-MET  Pt will improve postural strength (mid/low trap) to 5/5 to promote improved upright posturing and decreased pain with lifting   Pt will improve shoulder flexion AROM to >/=150 degrees to be able to reach into overhead cabinets without pain or restriction -MET   Pt will improve shoulder abduction AROM to >/=150 degrees to improve ability to don deodorant, don/doff shirts, and wash hair -MET   Pt will increase shoulder AROM ER to 90 to be able to reach and fasten seatbelt -MET  Pt will increase shoulder AROM IR to 70 to be able to reach in back pocket, tuck in shirt, and turn steering wheel without pain-MET  Pt will improve shoulder strength throughout to 5/5 to improve function with lifting and reaching   Pt will be independent and compliant with comprehensive HEP to maintain  progress achieved in PT      Plan: Continue plan of care 2x/week for 12 more visits. Therapeutic exercises to include: ROM, stretching, strengthening, HEP, posture education/training, scapular and core stabilization.  Manual therapy to include: joint mobilizations to restore normal joint mechanics and decrease pain, instrument assisted soft tissue mobilization. Modalities: e-stim, heat/cold for pain relief.   Date: 2/29/2024   Tx#: 9/12 Date: 3/4/2024  Tx#: 10/ 12 Date: 3/6/2024   Tx#: 11/12 Date: 3/18/2024   Tx#: 12/ Date: 3/21/2024   Tx#: 13 Date: 3/27/2024   Tx#: 14 Date: 4/1/2024   Tx#: 15 Date:   Tx#: Date:   Tx#:   Ther Ex:     Subj assessment      Assessed:  Cervical ROM  Shoulder ROM  Shoulder strength    Scifit F/B 3 min ea L3    Wall wipes red ball x20 w chin tuck    Wall push ups x15    Prone-  Scap retraction/depression+  Sh ext  Habd  Row  X10 ea    Scap retraction + depression cable 10# x20    Precor IR/ER 10# 2x10    B shoulder ext cable 10# x20    Thoracic rotation with cervical rotation cable 10# R/L x20    1/2 foam roller-standing  Alt shoulder flexion--Not today   B shoulder flexion  Scaption  1# x10--Not today     Thoracic rotation w habd R/L -hands on wall    R Upper trap stretch -  20 sec x3    KT R supportive GH/AC jt Ther ex     Subj assessment      Scifit F/B 3 min ea L3    Prone-  Scap retraction/depression+  Sh ext  Habd  Row  Y x 10 NEW  X10 ea    Seated scap retraction to address postural defcits x 20    Standing , 1/2 FR, W retractions. X 10   UT stretch x 20 secs x 5   Standing with 1/2 FR 90 / 90 ER / IR with GTB resistance x 2 x 10 NEW    Standing thoracic rotation x 10     1/2 foam roller-standing  Alt shoulder flexion   B shoulder flexion  Scaption  1# x10  B shoulder ext cable 10# x20       Ther ex/NMR   Subj assessment     Scifit F/B 3 min ea L3    Seated scap retraction to address postural defcits x 20    90/90 IR / ER with GTB loop at precor 10# new  Resisted IR  / ER at precor   with Green TB loop  x 20   Prone Is x 15  Prone Ys x 15  Prone HA  x 15    Ther Ex/NMR:   Subj assessment      Progress note      Scifit F/B 3 min ea L3- hills    Precor-  Scap retraction+row 10# 2x10        90/90 IR / ER with GTB 3x10    Prone Is x 15 1#  Prone Ys x 15 1#  Prone HA  x 15 1#  Row x 15 2#    Cane 2# x10 flexion Ther Ex/NMR:   Subj assessment       Scifit F/B 3 min ea L3- hills    Triceps extension RTB 2x10  B sh ext RTB 2x10    ER red spri 2x10  IR red spri 2x10    Precor-  Scap retraction+row 10# 2x10    Precor-  IR 10# 2x15  ER 10# 2x15    90/90 IR / ER with GTB 3x10    SL-ER 2# x20  SL-scaption 2# 2x10    Prone Is x 15 3#  Prone Ys x 15 3#  Prone HA  x 15 3#  Row x 15 2#    Snow angels x10    Wall push ups x10    Wall lift offs 2x10    KT-R shoulder supportive    functional manual release w anterior clavicle mobe+shoulder flexion     Ther Ex/NMR:   Subj assessment       Scifit F/B 3 min ea L3- hills    Biceps curl 8# 2x10    Triceps extension precor 15#  2x10    Precor-  IR 10# 2x15  ER 10# 2x15    Precor-  Scap retraction+row 10# 2x15  Shoulder ext 10# 2x15        Prone Is x 15 3#  Prone Ys x 15 3#  Prone HA  x 15 3#  Row x 15 2#    Snow angels x10    Wall push ups x10    Wall lift offs 2x10    Side step wall RTB R/L x1'    functional manual release w anterior clavicle mobe+shoulder flexion Ther Ex/NMR:   Subj assessment      Cervical and shoulder range of motion assessment     Scifit F/B 3 min ea L3- hills    Biceps curl 8# 2x10    Triceps extension precor 15#  2x10    Precor-  IR 10# 2x15  ER 10# 2x15    Precor-  Scap retraction+row 10# 2x15  Shoulder ext 10# 2x15    90/90 IR / ER with GTB 2x15 each    Prone Is x 15 3#  Prone Ys x 15 3#  Prone HA  x 15 3#  Row x 15 3#    Snow angels x10    Wall push ups x10    Wall lift offs 2x10    Side step wall RTB R/L x1'    functional manual release w anterior clavicle mobe+shoulder flexion     Manual therapy:   R upper trap, cervical paraspinals, levator  scapula, scalenes, supraspinatus , R AC joint and AC ligament    GIASTM upper trap, cervical paraspinals, levator scapula, scalenes, AC jt/ligament    AC gapping>clavicle gr II-III multiple bouts   NMR  Tape applied to support subscapularis and AC joint  Pt educated in purpose and removal of tape    MT  Clavicle jt mobes BW rotation  + w flexion in sitting  + grey theraband (HEP)    Posture ed   MT    Clavicle jt mobes BW rotation  -min MT    Clavicle jt mobes BW rotation  -min MT    Clavicle jt mobes BW rotation  -min     MHP -Not today            HEP: scap retraction x10, pendulum x10-for pain, c-spine SB x3 20 sec, cervical rotation x10-w and wo towel, cane-flex x10, flexion up wall x10, abduction sitting w towel x10 BID    Charges: Tex2  30 mins ', NMR x 1 10'       Total Timed Treatment: 40 min  Total Treatment Time: 40 min

## 2024-04-03 ENCOUNTER — OFFICE VISIT (OUTPATIENT)
Dept: PHYSICAL THERAPY | Facility: HOSPITAL | Age: 39
End: 2024-04-03
Attending: PREVENTIVE MEDICINE
Payer: OTHER MISCELLANEOUS

## 2024-04-03 PROCEDURE — 97140 MANUAL THERAPY 1/> REGIONS: CPT

## 2024-04-03 PROCEDURE — 97112 NEUROMUSCULAR REEDUCATION: CPT

## 2024-04-03 PROCEDURE — 97110 THERAPEUTIC EXERCISES: CPT

## 2024-04-03 NOTE — PROGRESS NOTES
Diagnosis:   Neck pain (M54.2)   Strain of right shoulder, subsequent encounter (S49.244R)          Comments:  Neck pain with shoulder pain - 2-3 times per week for 2 weeks       Referring Provider: Ac Horn  Date of Evaluation:    1/25/2024    Precautions:  None      Neck Disability Index Score  No data recorded Next MD visit:   none scheduled  Date of Surgery: n/a    12/27/2023-DOI   Insurance Primary/Secondary: [employer] COMP / N/A     # Auth Visits: 22 4/24/2024           MRI of the right shoulder: mild AC joint arthritis and low grade subscapularis irregularity/tearing.  Intact remainder of rotator cuff tendons.  Type II acromion.   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  Able to reach overhead without pain.   No pain with neck movements. Feels some tightness R upper trap with L sidebending.    Pain:  0/10 upper trap/clavicular region with shoulder flexion-no pain at rest      Objective: Tx flow sheet     Cervical AROM: (* denotes performed with pain)  Flexion: 50-no pain (was:20-pain)  Extension: 60-no pain (was:30-pain)  Sidebending: B 35-no pain, tightness L sidebending (was:20-tight R side)  Rotation: B 65-no pain (Was:R 30-stiff; L 35)    Shoulder AROM:standing  Flexion  R 150(was:110*)   Abduction R 150* (was:110*)   ER R 90 (was:40*) L 90  IR R T6 (was:T12*) L T6, supine R 65    PROM  Flexion R 160  Abduction 170      Strength: (* denotes performed with pain)  UE/Scapular   Shoulder Flex: R 5-/5 (was:4-/5*), L 5/5  Shoulder ABD (C5): R 5-/5 (was:4-/5*), L 5/5  Biceps (C6): R 5/5, L 5/5  Wrist ext (C6): R 5/5, L 5/5  Triceps (C7): R 5/5, L 5/5  Wrist Flex (C7): R 5/5, L 5/5  Digit Flex (C8): R 5/5, L 5/5  Thumb Ext (C8): R 5/5, L 5/5  Interossei (T1): R 5/5, L 5/5    Rhomboids: R 5-/5 (was:4-/5*), L 5/5  Mid trap: R  5-/5 (was:4-/5*); L 5/5  Lats: R nt, L nt  Low trap: R 5-/5 (was:4-/5*); L 5/5           Observation/Posture: FHP 15 degrees (was:25 degrees), 10  degrees upper cervical extensionrounded shoulders, MIN muscle guarding R Shoulder  SLOUCHED SITTING.      Accessory motion: WNL R lateral glides C4-6, NO mm guarding w CPA C2-5, tenderness R clavicular region with shoulder flexion that reduces with manual posterior rotation of clavicle with active shoulder flexion    Palpation: MIN tenderness and tension R upper/mid trap/cervical spine paraspinals/lev scap,  NO tenderness R AC joint and AC ligament    Assessment:   Pt able to perform full, active shoulder flexion with no pain.   Pain resolved after Manual therapy: for R upper trap and cervical paraspinal tightness. Pt instructed to perform neck stretches and to be aware of his posture throughout the day.  Added: rhythmic stabilization for improved R shoulder stabilization.  Goals progressing as expected.       Goals:   Goals: (to be met in 22 visits) -  Pt will improve cervical AROM flexion by 20 degrees to improve tolerance for looking down to tie shoes -MET  Pt will improve cervical AROM extension by 20 degrees to improve tolerance for putting dishes into overhead cabinets -MET  Pt will improve cervical AROM rotation to >20 degrees to improve tolerance for turning head to check blind spot while driving-MET  Pt will improve postural strength (mid/low trap) to 5/5 to promote improved upright posturing and decreased pain with lifting   Pt will improve shoulder flexion AROM to >/=150 degrees to be able to reach into overhead cabinets without pain or restriction -MET   Pt will improve shoulder abduction AROM to >/=150 degrees to improve ability to don deodorant, don/doff shirts, and wash hair -MET   Pt will increase shoulder AROM ER to 90 to be able to reach and fasten seatbelt -MET  Pt will increase shoulder AROM IR to 70 to be able to reach in back pocket, tuck in shirt, and turn steering wheel without pain-MET  Pt will improve shoulder strength throughout to 5/5 to improve function with lifting and reaching -ALMOST  MET  Pt will be independent and compliant with comprehensive HEP to maintain progress achieved in PT      Plan: Continue plan of care 2x/week for 12 more visits. Therapeutic exercises to include: ROM, stretching, strengthening, HEP, posture education/training, scapular and core stabilization.  Manual therapy to include: joint mobilizations to restore normal joint mechanics and decrease pain, instrument assisted soft tissue mobilization. Modalities: e-stim, heat/cold for pain relief. Next visit: focus on functional strengthening following restrictions  Date: 2/29/2024   Tx#: 9/12 Date: 3/4/2024  Tx#: 10/ 12 Date: 3/6/2024   Tx#: 11/12 Date: 3/18/2024   Tx#: 12/ Date: 3/21/2024   Tx#: 13 Date: 3/27/2024   Tx#: 14 Date: 4/1/2024   Tx#: 15 Date: 4/3/2024   Tx#: 16 Date:   Tx#: Date:   Tx#:   Ther Ex:     Subj assessment      Assessed:  Cervical ROM  Shoulder ROM  Shoulder strength    Scifit F/B 3 min ea L3    Wall wipes red ball x20 w chin tuck    Wall push ups x15    Prone-  Scap retraction/depression+  Sh ext  Habd  Row  X10 ea    Scap retraction + depression cable 10# x20    Precor IR/ER 10# 2x10    B shoulder ext cable 10# x20    Thoracic rotation with cervical rotation cable 10# R/L x20    1/2 foam roller-standing  Alt shoulder flexion--Not today   B shoulder flexion  Scaption  1# x10--Not today     Thoracic rotation w habd R/L -hands on wall    R Upper trap stretch -  20 sec x3    KT R supportive GH/AC jt Ther ex     Subj assessment      Scifit F/B 3 min ea L3    Prone-  Scap retraction/depression+  Sh ext  Habd  Row  Y x 10 NEW  X10 ea    Seated scap retraction to address postural defcits x 20    Standing , 1/2 FR, W retractions. X 10   UT stretch x 20 secs x 5   Standing with 1/2 FR 90 / 90 ER / IR with GTB resistance x 2 x 10 NEW    Standing thoracic rotation x 10     1/2 foam roller-standing  Alt shoulder flexion   B shoulder flexion  Scaption  1# x10  B shoulder ext cable 10# x20       Ther ex/NMR   Subj  assessment     Scifit F/B 3 min ea L3    Seated scap retraction to address postural defcits x 20    90/90 IR / ER with GTB loop at precor 10# new  Resisted IR  / ER at precor  with Green TB loop  x 20   Prone Is x 15  Prone Ys x 15  Prone HA  x 15    Ther Ex/NMR:   Subj assessment      Progress note      Scifit F/B 3 min ea L3- hills    Precor-  Scap retraction+row 10# 2x10        90/90 IR / ER with GTB 3x10    Prone Is x 15 1#  Prone Ys x 15 1#  Prone HA  x 15 1#  Row x 15 2#    Cane 2# x10 flexion Ther Ex/NMR:   Subj assessment       Scifit F/B 3 min ea L3- hills    Triceps extension RTB 2x10  B sh ext RTB 2x10    ER red spri 2x10  IR red spri 2x10    Precor-  Scap retraction+row 10# 2x10    Precor-  IR 10# 2x15  ER 10# 2x15    90/90 IR / ER with GTB 3x10    SL-ER 2# x20  SL-scaption 2# 2x10    Prone Is x 15 3#  Prone Ys x 15 3#  Prone HA  x 15 3#  Row x 15 2#    Snow angels x10    Wall push ups x10    Wall lift offs 2x10    KT-R shoulder supportive    functional manual release w anterior clavicle mobe+shoulder flexion     Ther Ex/NMR:   Subj assessment       Scifit F/B 3 min ea L3- hills    Biceps curl 8# 2x10    Triceps extension precor 15#  2x10    Precor-  IR 10# 2x15  ER 10# 2x15    Precor-  Scap retraction+row 10# 2x15  Shoulder ext 10# 2x15        Prone Is x 15 3#  Prone Ys x 15 3#  Prone HA  x 15 3#  Row x 15 2#    [place] angels x10    Wall push ups x10    Wall lift offs 2x10    Side step wall RTB R/L x1'    functional manual release w anterior clavicle mobe+shoulder flexion Ther Ex/NMR:   Subj assessment      Cervical and shoulder range of motion assessment     Scifit F/B 3 min ea L3- hills    Biceps curl 8# 2x10    Triceps extension precor 15#  2x10    Precor-  IR 10# 2x15  ER 10# 2x15    Precor-  Scap retraction+row 10# 2x15  Shoulder ext 10# 2x15    90/90 IR / ER with GTB 2x15 each    Prone Is x 15 3#  Prone Ys x 15 3#  Prone HA  x 15 3#  Row x 15 3#    Snow angels x10    Wall push ups x10    Wall lift  offs 2x10    Side step wall RTB R/L x1'    functional manual release w anterior clavicle mobe+shoulder flexion Ther Ex/NMR:   Subj assessment      Cervical and shoulder range of motion assessment     Scifit F/B 3 min ea L3- hills    Biceps curl 8# 2x10    Triceps extension precor 15#  2x10    Precor-  IR 10# 2x15  ER 10# 2x15    Precor-  Scap retraction+row 10# 2x15  Shoulder ext 10# 2x15    90/90 IR / ER with GTB 3x10  each    Prone Is x 15 3#  Prone Ys x 15 3#  Prone HA  x 15 3#  Row x 15 3#    Snow angels x10    Wall push ups x10    Wall lift offs 2x10    Side step wall RTB R/L x1'    Supine-RS R 1 min 90 deg flexion    L/R SB neck 20 sec ea         Manual therapy:   R upper trap, cervical paraspinals, levator scapula, scalenes, supraspinatus , R AC joint and AC ligament    GIASTM upper trap, cervical paraspinals, levator scapula, scalenes, AC jt/ligament    AC gapping>clavicle gr II-III multiple bouts   NMR  Tape applied to support subscapularis and AC joint  Pt educated in purpose and removal of tape    MT  Clavicle jt mobes BW rotation  + w flexion in sitting  + grey theraband (HEP)    Posture ed   MT    Clavicle jt mobes BW rotation  -min MT    Clavicle jt mobes BW rotation  -min MT    Clavicle jt mobes BW rotation  -min MT    STM R cervical paraspinals, upper trap, levator scapula     MHP -Not today             HEP: scap retraction x10, pendulum x10-for pain, c-spine SB x3 20 sec, cervical rotation x10-w and wo towel, cane-flex x10, flexion up wall x10, abduction sitting w towel x10 BID    Charges: Tex2  25 mins ', NMR x 1 10'    MT 10'   Total Timed Treatment: 45 min  Total Treatment Time: 45 min

## 2024-04-10 ENCOUNTER — OFFICE VISIT (OUTPATIENT)
Dept: PHYSICAL THERAPY | Facility: HOSPITAL | Age: 39
End: 2024-04-10
Attending: PREVENTIVE MEDICINE
Payer: OTHER MISCELLANEOUS

## 2024-04-10 PROCEDURE — 97112 NEUROMUSCULAR REEDUCATION: CPT

## 2024-04-10 PROCEDURE — 97110 THERAPEUTIC EXERCISES: CPT

## 2024-04-10 NOTE — PROGRESS NOTES
Diagnosis:   Neck pain (M54.2)   Strain of right shoulder, subsequent encounter (S43.783L)          Comments:  Neck pain with shoulder pain - 2-3 times per week for 2 weeks       Referring Provider: Ac Horn  Date of Evaluation:    1/25/2024    Precautions:  None      Neck Disability Index Score  No data recorded Next MD visit:   none scheduled  Date of Surgery: n/a    12/27/2023-DOI   Insurance Primary/Secondary: [employer] / N/A     # Auth Visits: 22 4/24/2024           MRI of the right shoulder: mild AC joint arthritis and low grade subscapularis irregularity/tearing.  Intact remainder of rotator cuff tendons.  Type II acromion.   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  Able to reach overhead without pain.   Feels some dull soreness R shoulder with movement. Feeing much better.   No pain with neck movements.   Feels like weights in clinic are heavy enough.    Pain:  0/10 upper trap/clavicular region with shoulder flexion-no pain at rest      Objective: Tx flow sheet     Cervical AROM: (* denotes performed with pain)  Flexion: 50-no pain (was:20-pain)  Extension: 60-no pain (was:30-pain)  Sidebending: B 35-no pain, NO tightness L sidebending (was:20-tight R side)  Rotation: B 65-no pain (Was:R 30-stiff; L 35)    Shoulder AROM:standing  Flexion  R 150(was:110*)   Abduction R 150* (was:110*)   ER R 90 (was:40*) L 90  IR R T6 (was:T12*) L T6, supine R 65    PROM  Flexion R 160  Abduction 170      Strength: (* denotes performed with pain)  UE/Scapular   Shoulder Flex: R 5-/5 (was:4-/5*), L 5/5  Shoulder ABD (C5): R 5-/5 (was:4-/5*), L 5/5  Biceps (C6): R 5/5, L 5/5  Wrist ext (C6): R 5/5, L 5/5  Triceps (C7): R 5/5, L 5/5  Wrist Flex (C7): R 5/5, L 5/5  Digit Flex (C8): R 5/5, L 5/5  Thumb Ext (C8): R 5/5, L 5/5  Interossei (T1): R 5/5, L 5/5    Rhomboids: R 5-/5 (was:4-/5*), L 5/5  Mid trap: R  5-/5 (was:4-/5*); L 5/5  Lats: R nt, L nt  Low trap: R 5-/5 (was:4-/5*); L  5/5           Observation/Posture: FHP 15 degrees (was:25 degrees), 10 degrees upper cervical extensionrounded shoulders, MIN muscle guarding R Shoulder  SLOUCHED SITTING.      Accessory motion: WNL R lateral glides C4-6, NO mm guarding w CPA C2-5, tenderness R clavicular region with shoulder flexion that reduces with manual posterior rotation of clavicle with active shoulder flexion    Palpation: MIN tenderness and tension R upper/mid trap/cervical spine paraspinals/lev scap,  NO tenderness R AC joint and AC ligament    Assessment:   Pt able to perform full, active shoulder flexion with no pain.   Pt able to perform strengthening exercises correctly with verbal and manual cueing.  Added: RTB w wall lifts offs/serratus anterior strengthening  No adverse reaction reported.    Goals progressing as expected.       Goals:   Goals: (to be met in 22 visits) -  Pt will improve cervical AROM flexion by 20 degrees to improve tolerance for looking down to tie shoes -MET  Pt will improve cervical AROM extension by 20 degrees to improve tolerance for putting dishes into overhead cabinets -MET  Pt will improve cervical AROM rotation to >20 degrees to improve tolerance for turning head to check blind spot while driving-MET  Pt will improve postural strength (mid/low trap) to 5/5 to promote improved upright posturing and decreased pain with lifting   Pt will improve shoulder flexion AROM to >/=150 degrees to be able to reach into overhead cabinets without pain or restriction -MET   Pt will improve shoulder abduction AROM to >/=150 degrees to improve ability to don deodorant, don/doff shirts, and wash hair -MET   Pt will increase shoulder AROM ER to 90 to be able to reach and fasten seatbelt -MET  Pt will increase shoulder AROM IR to 70 to be able to reach in back pocket, tuck in shirt, and turn steering wheel without pain-MET  Pt will improve shoulder strength throughout to 5/5 to improve function with lifting and reaching -ALMOST  MET  Pt will be independent and compliant with comprehensive HEP to maintain progress achieved in PT      Plan: Continue plan of care 2x/week for 12 more visits. Therapeutic exercises to include: ROM, stretching, strengthening, HEP, posture education/training, scapular and core stabilization.  Manual therapy to include: joint mobilizations to restore normal joint mechanics and decrease pain, instrument assisted soft tissue mobilization. Modalities: e-stim, heat/cold for pain relief. Next visit: focus on functional strengthening following restrictions  Date: 2/29/2024   Tx#: 9/12 Date: 3/4/2024  Tx#: 10/ 12 Date: 3/6/2024   Tx#: 11/12 Date: 3/18/2024   Tx#: 12/ Date: 3/21/2024   Tx#: 13 Date: 3/27/2024   Tx#: 14 Date: 4/1/2024   Tx#: 15 Date: 4/3/2024   Tx#: 16 Date: 4/10/2024   Tx#: 17 Date:   Tx#:18 Date:   Tx#:19 Date:   Tx#:   Ther Ex:     Subj assessment      Assessed:  Cervical ROM  Shoulder ROM  Shoulder strength    Scifit F/B 3 min ea L3    Wall wipes red ball x20 w chin tuck    Wall push ups x15    Prone-  Scap retraction/depression+  Sh ext  Habd  Row  X10 ea    Scap retraction + depression cable 10# x20    Precor IR/ER 10# 2x10    B shoulder ext cable 10# x20    Thoracic rotation with cervical rotation cable 10# R/L x20    1/2 foam roller-standing  Alt shoulder flexion--Not today   B shoulder flexion  Scaption  1# x10--Not today     Thoracic rotation w habd R/L -hands on wall    R Upper trap stretch -  20 sec x3    KT R supportive GH/AC jt Ther ex     Subj assessment      Scifit F/B 3 min ea L3    Prone-  Scap retraction/depression+  Sh ext  Habd  Row  Y x 10 NEW  X10 ea    Seated scap retraction to address postural defcits x 20    Standing , 1/2 FR, W retractions. X 10   UT stretch x 20 secs x 5   Standing with 1/2 FR 90 / 90 ER / IR with GTB resistance x 2 x 10 NEW    Standing thoracic rotation x 10     1/2 foam roller-standing  Alt shoulder flexion   B shoulder flexion  Scaption  1# x10  B shoulder ext  cable 10# x20       Ther ex/NMR   Subj assessment     Scifit F/B 3 min ea L3    Seated scap retraction to address postural defcits x 20    90/90 IR / ER with GTB loop at precor 10# new  Resisted IR  / ER at precor  with Green TB loop  x 20   Prone Is x 15  Prone Ys x 15  Prone HA  x 15    Ther Ex/NMR:   Subj assessment      Progress note      Scifit F/B 3 min ea L3- hills    Precor-  Scap retraction+row 10# 2x10        90/90 IR / ER with GTB 3x10    Prone Is x 15 1#  Prone Ys x 15 1#  Prone HA  x 15 1#  Row x 15 2#    Cane 2# x10 flexion Ther Ex/NMR:   Subj assessment       Scifit F/B 3 min ea L3- hills    Triceps extension RTB 2x10  B sh ext RTB 2x10    ER red spri 2x10  IR red spri 2x10    Precor-  Scap retraction+row 10# 2x10    Precor-  IR 10# 2x15  ER 10# 2x15    90/90 IR / ER with GTB 3x10    SL-ER 2# x20  SL-scaption 2# 2x10    Prone Is x 15 3#  Prone Ys x 15 3#  Prone HA  x 15 3#  Row x 15 2#    Snow angels x10    Wall push ups x10    Wall lift offs 2x10    KT-R shoulder supportive    functional manual release w anterior clavicle mobe+shoulder flexion     Ther Ex/NMR:   Subj assessment       Scifit F/B 3 min ea L3- hills    Biceps curl 8# 2x10    Triceps extension precor 15#  2x10    Precor-  IR 10# 2x15  ER 10# 2x15    Precor-  Scap retraction+row 10# 2x15  Shoulder ext 10# 2x15        Prone Is x 15 3#  Prone Ys x 15 3#  Prone HA  x 15 3#  Row x 15 2#    Snow angels x10    Wall push ups x10    Wall lift offs 2x10    Side step wall RTB R/L x1'    functional manual release w anterior clavicle mobe+shoulder flexion Ther Ex/NMR:   Subj assessment      Cervical and shoulder range of motion assessment     Scifit F/B 3 min ea L3- hills    Biceps curl 8# 2x10    Triceps extension precor 15#  2x10    Precor-  IR 10# 2x15  ER 10# 2x15    Precor-  Scap retraction+row 10# 2x15  Shoulder ext 10# 2x15    90/90 IR / ER with GTB 2x15 each    Prone Is x 15 3#  Prone Ys x 15 3#  Prone HA  x 15 3#  Row x 15 3#    Snow angels  x10    Wall push ups x10    Wall lift offs 2x10    Side step wall RTB R/L x1'    functional manual release w anterior clavicle mobe+shoulder flexion Ther Ex/NMR:   Subj assessment      Cervical and shoulder range of motion assessment     Scifit F/B 3 min ea L3- hills    Biceps curl 8# 2x10    Triceps extension precor 15#  2x10    Precor-  IR 10# 2x15  ER 10# 2x15    Precor-  Scap retraction+row 10# 2x15  Shoulder ext 10# 2x15    90/90 IR / ER with GTB 3x10  each    Prone Is x 15 3#  Prone Ys x 15 3#  Prone HA  x 15 3#  Row x 15 3#    Snow angels x10    Wall push ups x10    Wall lift offs 2x10    Side step wall RTB R/L x1'    Supine-RS R 1 min 90 deg flexion    L/R SB neck 20 sec ea     Ther Ex/NMR:   Subj assessment      Cervical and shoulder range of motion assessment     Scifit F/B 3 min ea L3- hills    Biceps curl 8# 2x10    Triceps extension precor 15#  2x10    Precor-  IR 10# 2x15  ER 10# 2x15    Precor-  Scap retraction+row 10# 2x15  Shoulder ext 10# 2x15    90/90 IR / ER with GTB 3x10  each    Prone Is x 15 3#  Prone Ys x 15 3#  Prone HA  x 15 3#  Row x 15 3#    Snow angels x10    Wall push ups x10    Wall lift offs 2x10 w RTB    Side step wall RTB R/L x1'    Supine-RS R 1 min 90 deg flexion          Manual therapy:   R upper trap, cervical paraspinals, levator scapula, scalenes, supraspinatus , R AC joint and AC ligament    GIASTM upper trap, cervical paraspinals, levator scapula, scalenes, AC jt/ligament    AC gapping>clavicle gr II-III multiple bouts   NMR  Tape applied to support subscapularis and AC joint  Pt educated in purpose and removal of tape    MT  Clavicle jt mobes BW rotation  + w flexion in sitting  + grey theraband (HEP)    Posture ed   MT    Clavicle jt mobes BW rotation  -min MT    Clavicle jt mobes BW rotation  -min MT    Clavicle jt mobes BW rotation  -min MT    STM R cervical paraspinals, upper trap, levator scapula x      MHP -Not today               HEP: scap retraction x10, pendulum  x10-for pain, c-spine SB x3 20 sec, cervical rotation x10-w and wo towel, cane-flex x10, flexion up wall x10, abduction sitting w towel x10 BID    Charges: Tex2  30 mins ', NMR x 1 10'     Total Timed Treatment: 40 min  Total Treatment Time: 40 min

## 2024-04-15 ENCOUNTER — OFFICE VISIT (OUTPATIENT)
Dept: PHYSICAL THERAPY | Facility: HOSPITAL | Age: 39
End: 2024-04-15
Attending: PREVENTIVE MEDICINE
Payer: OTHER MISCELLANEOUS

## 2024-04-15 PROCEDURE — 97110 THERAPEUTIC EXERCISES: CPT | Performed by: PHYSICAL THERAPY ASSISTANT

## 2024-04-15 NOTE — PROGRESS NOTES
Diagnosis:   Neck pain (M54.2)   Strain of right shoulder, subsequent encounter (S43.638K)          Comments:  Neck pain with shoulder pain - 2-3 times per week for 2 weeks       Referring Provider: Ac Horn  Date of Evaluation:    1/25/2024    Precautions:  None      Neck Disability Index Score  No data recorded Next MD visit:   none scheduled  Date of Surgery: n/a    12/27/2023-DOI   Insurance Primary/Secondary: [employer] / N/A     # Auth Visits: 22 4/24/2024           MRI of the right shoulder: mild AC joint arthritis and low grade subscapularis irregularity/tearing.  Intact remainder of rotator cuff tendons.  Type II acromion.   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  Able to reach overhead without pain. States he has full ROM.   Seeing MD May 1st to review restrictions - feeling ready to return to full duty.   Only feeling sore on tip of shoulder -'like a bruise'    Pain:  0/10 upper trap/clavicular region with shoulder flexion-no pain at rest      Objective: Tx flow sheet     Cervical AROM: (* denotes performed with pain)  Flexion: 50-no pain (was:20-pain)  Extension: 60-no pain (was:30-pain)  Sidebending: B 35-no pain, NO tightness L sidebending (was:20-tight R side)  Rotation: B 65-no pain (Was:R 30-stiff; L 35)    Shoulder AROM:standing  Flexion  R 150(was:110*)   Abduction R 150* (was:110*)   ER R 90 (was:40*) L 90  IR R T6 (was:T12*) L T6, supine R 65    PROM  Flexion R 160  Abduction 170      Strength: (* denotes performed with pain)  UE/Scapular   Shoulder Flex: R 5-/5 (was:4-/5*), L 5/5  Shoulder ABD (C5): R 5-/5 (was:4-/5*), L 5/5  Biceps (C6): R 5/5, L 5/5  Wrist ext (C6): R 5/5, L 5/5  Triceps (C7): R 5/5, L 5/5  Wrist Flex (C7): R 5/5, L 5/5  Digit Flex (C8): R 5/5, L 5/5  Thumb Ext (C8): R 5/5, L 5/5  Interossei (T1): R 5/5, L 5/5    Rhomboids: R 5-/5 (was:4-/5*), L 5/5  Mid trap: R  5-/5 (was:4-/5*); L 5/5  Lats: R nt, L nt  Low trap: R 5-/5  (was:4-/5*); L 5/5           Observation/Posture: FHP 15 degrees (was:25 degrees), 10 degrees upper cervical extensionrounded shoulders, MIN muscle guarding R Shoulder  SLOUCHED SITTING.    Palpation: MIN tenderness and tension R upper/mid trap/cervical spine paraspinals/lev scap,  No tenderness R AC joint and AC ligament - not assessed 4/15/2024     Assessment:   Pt able to perform full, active shoulder flexion with no pain.   Pt able to perform strengthening exercises correctly with verbal and manual cueing.  Added: Body blade and was able to progress with 90/90 PRE to 10# from TB  No adverse reaction reported.    Goals progressing as expected.       Goals:   Goals: (to be met in 22 visits) -  Pt will improve cervical AROM flexion by 20 degrees to improve tolerance for looking down to tie shoes -MET  Pt will improve cervical AROM extension by 20 degrees to improve tolerance for putting dishes into overhead cabinets -MET  Pt will improve cervical AROM rotation to >20 degrees to improve tolerance for turning head to check blind spot while driving-MET  Pt will improve postural strength (mid/low trap) to 5/5 to promote improved upright posturing and decreased pain with lifting   Pt will improve shoulder flexion AROM to >/=150 degrees to be able to reach into overhead cabinets without pain or restriction -MET   Pt will improve shoulder abduction AROM to >/=150 degrees to improve ability to don deodorant, don/doff shirts, and wash hair -MET   Pt will increase shoulder AROM ER to 90 to be able to reach and fasten seatbelt -MET  Pt will increase shoulder AROM IR to 70 to be able to reach in back pocket, tuck in shirt, and turn steering wheel without pain-MET  Pt will improve shoulder strength throughout to 5/5 to improve function with lifting and reaching -ALMOST MET  Pt will be independent and compliant with comprehensive HEP to maintain progress achieved in PT      Plan: Continue plan of care 2x/week for 12 more visits.  Therapeutic exercises to include: ROM, stretching, strengthening, HEP, posture education/training, scapular and core stabilization.  Manual therapy to include: joint mobilizations to restore normal joint mechanics and decrease pain, instrument assisted soft tissue mobilization. Modalities: e-stim, heat/cold for pain relief. Next visit: focus on functional strengthening following restrictions  Date: 4/1/2024   Tx#: 15 Date: 4/3/2024   Tx#: 16 Date: 4/10/2024   Tx#: 17 Date: 4/15/2024   Tx#:18 Date:   Tx#:19 Date:   Tx#:   Ther Ex/NMR:   Subj assessment      Cervical and shoulder range of motion assessment     Scifit F/B 3 min ea L3- hills    Biceps curl 8# 2x10    Triceps extension precor 15#  2x10    Precor-  IR 10# 2x15  ER 10# 2x15    Precor-  Scap retraction+row 10# 2x15  Shoulder ext 10# 2x15    90/90 IR / ER with GTB 2x15 each    Prone Is x 15 3#  Prone Ys x 15 3#  Prone HA  x 15 3#  Row x 15 3#    Snow angels x10    Wall push ups x10    Wall lift offs 2x10    Side step wall RTB R/L x1'    functional manual release w anterior clavicle mobe+shoulder flexion Ther Ex/NMR:   Subj assessment      Cervical and shoulder range of motion assessment     Scifit F/B 3 min ea L3- hills    Biceps curl 8# 2x10    Triceps extension precor 15#  2x10    Precor-  IR 10# 2x15  ER 10# 2x15    Precor-  Scap retraction+row 10# 2x15  Shoulder ext 10# 2x15    90/90 IR / ER with GTB 3x10  each    Prone Is x 15 3#  Prone Ys x 15 3#  Prone HA  x 15 3#  Row x 15 3#    Snow angels x10    Wall push ups x10    Wall lift offs 2x10    Side step wall RTB R/L x1'    Supine-RS R 1 min 90 deg flexion    L/R SB neck 20 sec ea     Ther Ex/NMR:   Subj assessment      Cervical and shoulder range of motion assessment     Scifit F/B 3 min ea L3- hills    Biceps curl 8# 2x10    Triceps extension precor 15#  2x10    Precor-  IR 10# 2x15  ER 10# 2x15    Precor-  Scap retraction+row 10# 2x15  Shoulder ext 10# 2x15    90/90 IR / ER with GTB 3x10   each    Prone Is x 15 3#  Prone Ys x 15 3#  Prone HA  x 15 3#  Row x 15 3#    Snow angels x10    Wall push ups x10    Wall lift offs 2x10 w RTB    Side step wall RTB R/L x1'    Supine-RS R 1 min 90 deg flexion     Ther Ex/NMR:   Subj assessment    Cervical and shoulder range of motion assessment     Scifit F/B 3 min ea L3- hills    Biceps curl 8# 2 x10    Triceps extension precor 15#  2x10    Precor-  IR 10# 2x15  ER 10# 2x15    Precor-  Scap retraction+row 10# 2x15  Shoulder ext 10# 2x15    90/90 IR / ER with 10#  3x10  each Progression    Prone Is x 15 3#  Prone Ys x 15 3#  Prone HA  x 15 3#  Row x 15 3#  Snow angels x10    Wall lift offs 2x10 w RTB  ER in to RTB - steering wheels x 20 NEW  Body blade - Horizonatal, Vertical A/P and M/L 30 secs each NEW      MT    Clavicle jt mobes BW rotation  -min MT    STM R cervical paraspinals, upper trap, levator scapula x MT  NT             HEP: scap retraction x10, pendulum x10-for pain, c-spine SB x3 20 sec, cervical rotation x10-w and wo towel, cane-flex x10, flexion up wall x10, abduction sitting w towel x10 BID    Charges: Tex2  30 mins ', NMR x 1 10'     Total Timed Treatment: 40 min  Total Treatment Time: 40 min

## 2024-04-17 ENCOUNTER — OFFICE VISIT (OUTPATIENT)
Dept: PHYSICAL THERAPY | Facility: HOSPITAL | Age: 39
End: 2024-04-17
Attending: PREVENTIVE MEDICINE
Payer: OTHER MISCELLANEOUS

## 2024-04-17 PROCEDURE — 97110 THERAPEUTIC EXERCISES: CPT | Performed by: PHYSICAL THERAPY ASSISTANT

## 2024-04-17 PROCEDURE — 97112 NEUROMUSCULAR REEDUCATION: CPT | Performed by: PHYSICAL THERAPY ASSISTANT

## 2024-04-17 NOTE — PROGRESS NOTES
Diagnosis:   Neck pain (M54.2)   Strain of right shoulder, subsequent encounter (S46.692N)          Comments:  Neck pain with shoulder pain - 2-3 times per week for 2 weeks       Referring Provider: Ac Horn  Date of Evaluation:    1/25/2024    Precautions:  None      Neck Disability Index Score  No data recorded Next MD visit:   none scheduled  Date of Surgery: n/a    12/27/2023-DOI   Insurance Primary/Secondary: [employer] COMP / N/A     # Auth Visits: 22 4/24/2024           MRI of the right shoulder: mild AC joint arthritis and low grade subscapularis irregularity/tearing.  Intact remainder of rotator cuff tendons.  Type II acromion.   10# restriction per MD    Subjective:   No change from previous session. No pain today.   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  Able to reach overhead without pain with full ROM.   Seeing MD May 1st to review restrictions - feeling ready to return to full duty.   Pain:  0/10     Objective: Tx flow sheet     Cervical AROM: IE: (* denotes performed with pain)  Flexion: 50-no pain (was:20-pain)  Extension: 60-no pain (was:30-pain)  Sidebending: B 35-no pain, NO tightness L sidebending (was:20-tight R side)  Rotation: B 65-no pain (Was:R 30-stiff; L 35)    Shoulder AROM:standing  Flexion  R 150(was:110*)   Abduction R 150* (was:110*)   ER R 90 (was:40*) L 90  IR R T6 (was:T12*) L T6, supine R 65    Strength: (* denotes performed with pain)  UE/Scapular   Shoulder Flex: R 5-/5 (was:4-/5*), L 5/5  Shoulder ABD (C5): R 5-/5 (was:4-/5*), L 5/5  Biceps (C6): R 5/5, L 5/5  Wrist ext (C6): R 5/5, L 5/5  Triceps (C7): R 5/5, L 5/5  Wrist Flex (C7): R 5/5, L 5/5  Digit Flex (C8): R 5/5, L 5/5  Thumb Ext (C8): R 5/5, L 5/5  Interossei (T1): R 5/5, L 5/5    Rhomboids: R 5-/5 (was:4-/5*), L 5/5  Mid trap: R  5-/5 (was:4-/5*); L 5/5  Lats: R nt, L nt  Low trap: R 5-/5 (was:4-/5*); L 5/5           Observation/Posture: FHP 15 degrees (was:25 degrees), 10 degrees upper cervical extensionrounded  shoulders, MIN muscle guarding R Shoulder  SLOUCHED SITTING.    Palpation: MIN tenderness and tension R upper/mid trap/cervical spine paraspinals/lev scap,  No tenderness R AC joint and AC ligament - not assessed 4/15/2024     Assessment:   Pt able to perform full, active shoulder flexion with no pain.   Pt able to perform strengthening exercises correctly with minimal verbal and manual cueing.  Added: TB Ys and Ts. RSB ER roll ups. Pillow case ER vertical slides  No adverse reaction reported.    Goals progressing as expected.   Pt wishing to finish up final authorized visits before discharge.       Goals:   Goals: (to be met in 22 visits) -  Pt will improve cervical AROM flexion by 20 degrees to improve tolerance for looking down to tie shoes -MET  Pt will improve cervical AROM extension by 20 degrees to improve tolerance for putting dishes into overhead cabinets -MET  Pt will improve cervical AROM rotation to >20 degrees to improve tolerance for turning head to check blind spot while driving-MET  Pt will improve postural strength (mid/low trap) to 5/5 to promote improved upright posturing and decreased pain with lifting   Pt will improve shoulder flexion AROM to >/=150 degrees to be able to reach into overhead cabinets without pain or restriction -MET   Pt will improve shoulder abduction AROM to >/=150 degrees to improve ability to don deodorant, don/doff shirts, and wash hair -MET   Pt will increase shoulder AROM ER to 90 to be able to reach and fasten seatbelt -MET  Pt will increase shoulder AROM IR to 70 to be able to reach in back pocket, tuck in shirt, and turn steering wheel without pain-MET  Pt will improve shoulder strength throughout to 5/5 to improve function with lifting and reaching -ALMOST MET  Pt will be independent and compliant with comprehensive HEP to maintain progress achieved in PT      Plan: Continue plan of care 2x/week for 12 more visits. Therapeutic exercises to include: ROM, stretching,  strengthening, HEP, posture education/training, scapular and core stabilization.  Manual therapy to include: joint mobilizations to restore normal joint mechanics and decrease pain, instrument assisted soft tissue mobilization. Modalities: e-stim, heat/cold for pain relief. Next visit: focus on functional strengthening following restrictions  Date: 4/1/2024   Tx#: 15 Date: 4/3/2024   Tx#: 16 Date: 4/10/2024   Tx#: 17 Date: 4/15/2024   Tx#:18 Date: 4/17/2024   Tx#:19 Date:   Tx#:   Ther Ex/NMR:   Subj assessment      Cervical and shoulder range of motion assessment     Scifit F/B 3 min ea L3- hills    Biceps curl 8# 2x10    Triceps extension precor 15#  2x10    Precor-  IR 10# 2x15  ER 10# 2x15    Precor-  Scap retraction+row 10# 2x15  Shoulder ext 10# 2x15    90/90 IR / ER with GTB 2x15 each    Prone Is x 15 3#  Prone Ys x 15 3#  Prone HA  x 15 3#  Row x 15 3#    Snow angels x10    Wall push ups x10    Wall lift offs 2x10    Side step wall RTB R/L x1'    functional manual release w anterior clavicle mobe+shoulder flexion Ther Ex/NMR:   Subj assessment      Cervical and shoulder range of motion assessment     Scifit F/B 3 min ea L3- hills    Biceps curl 8# 2x10    Triceps extension precor 15#  2x10    Precor-  IR 10# 2x15  ER 10# 2x15    Precor-  Scap retraction+row 10# 2x15  Shoulder ext 10# 2x15    90/90 IR / ER with GTB 3x10  each    Prone Is x 15 3#  Prone Ys x 15 3#  Prone HA  x 15 3#  Row x 15 3#    Snow angels x10    Wall push ups x10    Wall lift offs 2x10    Side step wall RTB R/L x1'    Supine-RS R 1 min 90 deg flexion    L/R SB neck 20 sec ea     Ther Ex/NMR:   Subj assessment      Cervical and shoulder range of motion assessment     Scifit F/B 3 min ea L3- hills    Biceps curl 8# 2x10    Triceps extension precor 15#  2x10    Precor-  IR 10# 2x15  ER 10# 2x15    Precor-  Scap retraction+row 10# 2x15  Shoulder ext 10# 2x15    90/90 IR / ER with GTB 3x10  each    Prone Is x 15 3#  Prone Ys x 15 3#  Prone HA   x 15 3#  Row x 15 3#    Snow angels x10    Wall push ups x10    Wall lift offs 2x10 w RTB    Side step wall RTB R/L x1'    Supine-RS R 1 min 90 deg flexion     Ther Ex/NMR:   Subj assessment    Cervical and shoulder range of motion assessment     Scifit F/B 3 min ea L3- hills    Biceps curl 8# 2 x10    Triceps extension precor 15#  2x10    Precor-  IR 10# 2x15  ER 10# 2x15    Precor-  Scap retraction+row 10# 2x15  Shoulder ext 10# 2x15    90/90 IR / ER with 10#  3x10  each Progression    Prone Is x 15 3#  Prone Ys x 15 3#  Prone HA  x 15 3#  Row x 15 3#  Snow angels x10  Wall lift offs 2x10 w RTB  ER in to RTB - steering wheels x 20 NEW  Body blade - Horizonatal, Vertical A/P and M/L 30 secs each - continuous motion.  Ther EX / NMR  Subj assessment    UBE  3 mins FW / BW level 3  6 mins total   Er swiss ball roll ups x 20   Banded t pulls w Mathews TB x 20  NEW  Banded y pulls x 20 w Grey TB  NEW  Pillow case ER vertical wall     Precor-  Scap retraction+row 10# 2x15  Shoulder ext 15# 2x15 - Progression  90/90 IR / ER with 10#  3x10  each   Body blade - Horizonatal, Vertical A/P and M/L 30 secs each     Prone Is x 15 3#  Prone Ys x 15 3#  Prone HA  x 15 3#  Row x 15 3#  Snow angels x10  ER in to RTB - steering wheels x 20         MT    Clavicle jt mobes BW rotation  -min MT    STM R cervical paraspinals, upper trap, levator scapula x MT  NT             HEP: scap retraction x10, pendulum x10-for pain, c-spine SB x3 20 sec, cervical rotation x10-w and wo towel, cane-flex x10, flexion up wall x10, abduction sitting w towel x10 BID    Charges: Tex2  30 mins ', NMR x 1 10'     Total Timed Treatment: 40 min  Total Treatment Time: 40 min

## 2024-04-23 ENCOUNTER — OFFICE VISIT (OUTPATIENT)
Dept: PHYSICAL THERAPY | Facility: HOSPITAL | Age: 39
End: 2024-04-23
Attending: PREVENTIVE MEDICINE
Payer: OTHER MISCELLANEOUS

## 2024-04-23 PROCEDURE — 97112 NEUROMUSCULAR REEDUCATION: CPT

## 2024-04-23 PROCEDURE — 97110 THERAPEUTIC EXERCISES: CPT

## 2024-04-23 NOTE — PROGRESS NOTES
Diagnosis:   Neck pain (M54.2)   Strain of right shoulder, subsequent encounter (S49.038F)          Comments:  Neck pain with shoulder pain - 2-3 times per week for 2 weeks       Referring Provider: Ac Horn  Date of Evaluation:    1/25/2024    Precautions:  None      Neck Disability Index Score  No data recorded Next MD visit:   none scheduled  Date of Surgery: n/a    12/27/2023-DOI   Insurance Primary/Secondary: [employer] COMP / N/A     # Auth Visits: 22 4/24/2024           MRI of the right shoulder: mild AC joint arthritis and low grade subscapularis irregularity/tearing.  Intact remainder of rotator cuff tendons.  Type II acromion.   10# restriction per MD    Subjective:   Able to reach overhead without pain with full ROM.   Little bit of stiffness top R shoulder after digging yesterday at work.   No neck pain.  Seeing MD May 1st to review restrictions - feeling ready to return to full duty.     Pain:  stiffness, no pain/10     Objective: Tx flow sheet     Cervical AROM: IE: (* denotes performed with pain)  Flexion: 50-no pain (was:20-pain)  Extension: 60-no pain (was:30-pain)  Sidebending: B 35-no pain, NO tightness L sidebending (was:20-tight R side)  Rotation: B 65-no pain (Was:R 30-stiff; L 35)    Shoulder AROM:standing  Flexion  R 150(was:110*)   Abduction R 150* (was:110*)   ER R 90 (was:40*) L 90  IR R T6 (was:T12*) L T6, supine R 65    Strength: (* denotes performed with pain)  UE/Scapular   Shoulder Flex: R 5/5 (was:4-/5*), L 5/5  Shoulder ABD (C5): R 5/5 (was:4-/5*), L 5/5  Biceps (C6): R 5/5, L 5/5  Wrist ext (C6): R 5/5, L 5/5  Triceps (C7): R 5/5, L 5/5  Wrist Flex (C7): R 5/5, L 5/5  Digit Flex (C8): R 5/5, L 5/5  Thumb Ext (C8): R 5/5, L 5/5  Interossei (T1): R 5/5, L 5/5    Rhomboids: R 5/5 (was:4-/5*), L 5/5  Mid trap: R  5/5 (was:4-/5*); L 5/5  Low trap: R 5-/5 (was:4-/5*); L 5/5  Lats 5/5 B         Observation/Posture: FHP 15 degrees (was:25 degrees), 10 degrees upper cervical  extensionrounded shoulders, MIN muscle guarding R Shoulder  SLOUCHED SITTING.    Palpation: NO tenderness and tension R upper/mid trap/cervical spine paraspinals/lev scap,  No tenderness R AC joint and AC ligament     Assessment:   Pt able to perform full, active shoulder and neck ROM with no pain.   Pt had no c/o pain throughout treatment. Noted some fatigue with scapular strengthening exs.  No strength deficits per MMT.  Pt able to perform strengthening exercises correctly with minimal verbal and manual cueing.  No adverse reaction reported.    Almost all goals met.      Goals:   Goals: (to be met in 22 visits) -  Pt will improve cervical AROM flexion by 20 degrees to improve tolerance for looking down to tie shoes -MET  Pt will improve cervical AROM extension by 20 degrees to improve tolerance for putting dishes into overhead cabinets -MET  Pt will improve cervical AROM rotation to >20 degrees to improve tolerance for turning head to check blind spot while driving-MET  Pt will improve postural strength (mid/low trap) to 5/5 to promote improved upright posturing and decreased pain with lifting   Pt will improve shoulder flexion AROM to >/=150 degrees to be able to reach into overhead cabinets without pain or restriction -MET   Pt will improve shoulder abduction AROM to >/=150 degrees to improve ability to don deodorant, don/doff shirts, and wash hair -MET   Pt will increase shoulder AROM ER to 90 to be able to reach and fasten seatbelt -MET  Pt will increase shoulder AROM IR to 70 to be able to reach in back pocket, tuck in shirt, and turn steering wheel without pain-MET  Pt will improve shoulder strength throughout to 5/5 to improve function with lifting and reaching -MET  Pt will be independent and compliant with comprehensive HEP to maintain progress achieved in PT  -TO BE MET IN 1-2 MORE VISITS    Post QuickDASH Outcome Score  Post Score: 0 % (4/23/2024  3:46 PM)      Plan: Continue plan of care 1-2 more  visits with plan to discharge.   Date: 4/1/2024   Tx#: 15 Date: 4/3/2024   Tx#: 16 Date: 4/10/2024   Tx#: 17 Date: 4/15/2024   Tx#:18 Date: 4/17/2024   Tx#:19 Date: 4/23/2024   Tx#:20   Ther Ex/NMR:   Subj assessment      Cervical and shoulder range of motion assessment     Scifit F/B 3 min ea L3- hills    Biceps curl 8# 2x10    Triceps extension precor 15#  2x10    Precor-  IR 10# 2x15  ER 10# 2x15    Precor-  Scap retraction+row 10# 2x15  Shoulder ext 10# 2x15    90/90 IR / ER with GTB 2x15 each    Prone Is x 15 3#  Prone Ys x 15 3#  Prone HA  x 15 3#  Row x 15 3#    Snow angels x10    Wall push ups x10    Wall lift offs 2x10    Side step wall RTB R/L x1'    functional manual release w anterior clavicle mobe+shoulder flexion Ther Ex/NMR:   Subj assessment      Cervical and shoulder range of motion assessment     Scifit F/B 3 min ea L3- hills    Biceps curl 8# 2x10    Triceps extension precor 15#  2x10    Precor-  IR 10# 2x15  ER 10# 2x15    Precor-  Scap retraction+row 10# 2x15  Shoulder ext 10# 2x15    90/90 IR / ER with GTB 3x10  each    Prone Is x 15 3#  Prone Ys x 15 3#  Prone HA  x 15 3#  Row x 15 3#    Snow angels x10    Wall push ups x10    Wall lift offs 2x10    Side step wall RTB R/L x1'    Supine-RS R 1 min 90 deg flexion    L/R SB neck 20 sec ea     Ther Ex/NMR:   Subj assessment      Cervical and shoulder range of motion assessment     Scifit F/B 3 min ea L3- hills    Biceps curl 8# 2x10    Triceps extension precor 15#  2x10    Precor-  IR 10# 2x15  ER 10# 2x15    Precor-  Scap retraction+row 10# 2x15  Shoulder ext 10# 2x15    90/90 IR / ER with GTB 3x10  each    Prone Is x 15 3#  Prone Ys x 15 3#  Prone HA  x 15 3#  Row x 15 3#    Snow angels x10    Wall push ups x10    Wall lift offs 2x10 w RTB    Side step wall RTB R/L x1'    Supine-RS R 1 min 90 deg flexion     Ther Ex/NMR:   Subj assessment    Cervical and shoulder range of motion assessment     Scifit F/B 3 min ea L3- hills    Biceps curl 8# 2  x10    Triceps extension precor 15#  2x10    Precor-  IR 10# 2x15  ER 10# 2x15    Precor-  Scap retraction+row 10# 2x15  Shoulder ext 10# 2x15    90/90 IR / ER with 10#  3x10  each Progression    Prone Is x 15 3#  Prone Ys x 15 3#  Prone HA  x 15 3#  Row x 15 3#  Snow angels x10  Wall lift offs 2x10 w RTB  ER in to RTB - steering wheels x 20 NEW  Body blade - Horizonatal, Vertical A/P and M/L 30 secs each - continuous motion.  Ther EX / NMR  Subj assessment    UBE  3 mins FW / BW level 3  6 mins total   Er swiss ball roll ups x 20   Banded t pulls w Mathews TB x 20  NEW  Banded y pulls x 20 w Grey TB  NEW  Pillow case ER vertical wall     Precor-  Scap retraction+row 10# 2x15  Shoulder ext 15# 2x15 - Progression  90/90 IR / ER with 10#  3x10  each   Body blade - Horizonatal, Vertical A/P and M/L 30 secs each     Prone Is x 15 3#  Prone Ys x 15 3#  Prone HA  x 15 3#  Row x 15 3#  Snow angels x10  ER in to RTB - steering wheels x 20      Ther EX / NMR  Subj assessment    Progress note    Sci Fit  3 mins FW / BW level 3  6 mins total     Banded t pulls w Mathews TB x 20    Precor-  IR 10# 2x15  ER 10# 2x15    Biceps curl 8# 2 x10    Triceps extension precor 20#  2x10-progression    Precor-  Scap retraction+row 20# 2x15-progression  Shoulder ext 20# 2x15 - Progression  90/90 IR / ER grey tband x20 ea  Body blade - Horizonatal, Vertical A/P and M/L 30 secs each   Prone Is x 15 3#  Prone Ys x 15 3#  Prone HA  x 15 3#  Row x 15 3#  Snow angels x10    Wall push ups x10    Wall lift offs x 15 w RTB    Side wall walking RTB x1'    Manual and verbal cueing throughout for correct body mechanics     MT    Clavicle jt mobes BW rotation  -min MT    STM R cervical paraspinals, upper trap, levator scapula x MT  NT             HEP: scap retraction x10, pendulum x10-for pain, c-spine SB x3 20 sec, cervical rotation x10-w and wo towel, cane-flex x10, flexion up wall x10, abduction sitting w towel x10 BID    Charges: Tex2  35 mins ', NMR x 1  10'     Total Timed Treatment: 45 min  Total Treatment Time: 45 min

## 2024-04-25 ENCOUNTER — OFFICE VISIT (OUTPATIENT)
Dept: PHYSICAL THERAPY | Facility: HOSPITAL | Age: 39
End: 2024-04-25
Attending: PREVENTIVE MEDICINE
Payer: OTHER MISCELLANEOUS

## 2024-04-25 PROCEDURE — 97110 THERAPEUTIC EXERCISES: CPT | Performed by: PHYSICAL THERAPY ASSISTANT

## 2024-04-25 NOTE — PROGRESS NOTES
Diagnosis:   Neck pain (M54.2)   Strain of right shoulder, subsequent encounter (S47.602A)          Comments:  Neck pain with shoulder pain - 2-3 times per week for 2 weeks       Referring Provider: Ac Horn  Date of Evaluation:    1/25/2024    Precautions:  None      Neck Disability Index Score  No data recorded Next MD visit:   none scheduled  Date of Surgery: n/a    12/27/2023-DOI   Insurance Primary/Secondary: WORKERS COMP / N/A     # Auth Visits: 22 4/24/2024           MRI of the right shoulder: mild AC joint arthritis and low grade subscapularis irregularity/tearing.  Intact remainder of rotator cuff tendons.  Type II acromion.   10# restriction per MD    Subjective:   All good - am ready for discharge and return to work   Pain:   0/10     Objective: Tx flow sheet     Cervical AROM: IE: (* denotes performed with pain)  Flexion: 50-no pain (was:20-pain)  Extension: 60-no pain (was:30-pain)  Sidebending: B 35-no pain, NO tightness L sidebending (was:20-tight R side)  Rotation: B 65-no pain (Was:R 30-stiff; L 35)    Shoulder AROM:standing  Flexion  R 150(was:110*)   Abduction R 150* (was:110*)   ER R 90 (was:40*) L 90  IR R T6 (was:T12*) L T6, supine R 65    Strength: (* denotes performed with pain)  UE/Scapular   Shoulder Flex: R 5/5 (was:4-/5*), L 5/5  Shoulder ABD (C5): R 5/5 (was:4-/5*), L 5/5  Biceps (C6): R 5/5, L 5/5  Wrist ext (C6): R 5/5, L 5/5  Triceps (C7): R 5/5, L 5/5  Wrist Flex (C7): R 5/5, L 5/5  Digit Flex (C8): R 5/5, L 5/5  Thumb Ext (C8): R 5/5, L 5/5  Interossei (T1): R 5/5, L 5/5    Rhomboids: R 5/5 (was:4-/5*), L 5/5  Mid trap: R  5/5 (was:4-/5*); L 5/5  Low trap: R 5-/5 (was:4-/5*); L 5/5  Lats 5/5 B         Observation/Posture: FHP 15 degrees (was:25 degrees), 10 degrees upper cervical extensionrounded shoulders, MIN muscle guarding R Shoulder  SLOUCHED SITTING.    Palpation: No tenderness and tension R upper/mid trap/cervical spine paraspinals/lev scap,  No tenderness R AC joint  and AC ligament     Assessment:     Pt has been attending physical therapy between 1/25/24 and 4/25/2024 for a total of 22 visits to address deficits as identified at initial assessment for Neck pain (M54.2) Strain of right shoulder, subsequent encounter (S46.661D)   Attendance has been good and HEP adherence has been reliable.  Pt demonstrates objective improvements in MMT and ROM as noted.    Pt reports functional improvements in all aspects and this is reflected in his Quickdash subjective outcome measure with a score change to report 0% disability.      Following a goal review the patient has met all of his goals as identified at initial assessment.  Zane is able to perform full, active shoulder and neck ROM with no pain and no strength deficits per MMT.    At this time Zane will be discharged and pending review from his MD will return to work without restriction.     Goals:   Goals: (to be met in 22 visits) -  Pt will improve cervical AROM flexion by 20 degrees to improve tolerance for looking down to tie shoes -MET  Pt will improve cervical AROM extension by 20 degrees to improve tolerance for putting dishes into overhead cabinets -MET  Pt will improve cervical AROM rotation to >20 degrees to improve tolerance for turning head to check blind spot while driving-MET  Pt will improve postural strength (mid/low trap) to 5/5 to promote improved upright posturing and decreased pain with lifting MET  Pt will improve shoulder flexion AROM to >/=150 degrees to be able to reach into overhead cabinets without pain or restriction -MET   Pt will improve shoulder abduction AROM to >/=150 degrees to improve ability to don deodorant, don/doff shirts, and wash hair -MET   Pt will increase shoulder AROM ER to 90 to be able to reach and fasten seatbelt -MET  Pt will increase shoulder AROM IR to 70 to be able to reach in back pocket, tuck in shirt, and turn steering wheel without pain-MET  Pt will improve shoulder strength  throughout to 5/5 to improve function with lifting and reaching -MET  Pt will be independent and compliant with comprehensive HEP to maintain progress achieved in PT  MET    Post QuickDASH Outcome Score  Post Score: 0 % (4/23/2024  3:46 PM)    Date: 4/1/2024   Tx#: 15 Date: 4/3/2024   Tx#: 16 Date: 4/10/2024   Tx#: 17 Date: 4/15/2024   Tx#:18 Date: 4/17/2024   Tx#:19 Date: 4/23/2024   Tx#:20 Date 4/25/2024   Tx 21   Ther Ex/NMR:   Subj assessment      Cervical and shoulder range of motion assessment     Scifit F/B 3 min ea L3- hills    Biceps curl 8# 2x10    Triceps extension precor 15#  2x10    Precor-  IR 10# 2x15  ER 10# 2x15    Precor-  Scap retraction+row 10# 2x15  Shoulder ext 10# 2x15    90/90 IR / ER with GTB 2x15 each    Prone Is x 15 3#  Prone Ys x 15 3#  Prone HA  x 15 3#  Row x 15 3#    Snow angels x10    Wall push ups x10    Wall lift offs 2x10    Side step wall RTB R/L x1'    functional manual release w anterior clavicle mobe+shoulder flexion Ther Ex/NMR:   Subj assessment      Cervical and shoulder range of motion assessment     Scifit F/B 3 min ea L3- hills    Biceps curl 8# 2x10    Triceps extension precor 15#  2x10    Precor-  IR 10# 2x15  ER 10# 2x15    Precor-  Scap retraction+row 10# 2x15  Shoulder ext 10# 2x15    90/90 IR / ER with GTB 3x10  each    Prone Is x 15 3#  Prone Ys x 15 3#  Prone HA  x 15 3#  Row x 15 3#    Snow angels x10    Wall push ups x10    Wall lift offs 2x10    Side step wall RTB R/L x1'    Supine-RS R 1 min 90 deg flexion    L/R SB neck 20 sec ea     Ther Ex/NMR:   Subj assessment      Cervical and shoulder range of motion assessment     Scifit F/B 3 min ea L3- hills    Biceps curl 8# 2x10    Triceps extension precor 15#  2x10    Precor-  IR 10# 2x15  ER 10# 2x15    Precor-  Scap retraction+row 10# 2x15  Shoulder ext 10# 2x15    90/90 IR / ER with GTB 3x10  each    Prone Is x 15 3#  Prone Ys x 15 3#  Prone HA  x 15 3#  Row x 15 3#    Snow angels x10    Wall push ups  x10    Wall lift offs 2x10 w RTB    Side step wall RTB R/L x1'    Supine-RS R 1 min 90 deg flexion     Ther Ex/NMR:   Subj assessment    Cervical and shoulder range of motion assessment     Scifit F/B 3 min ea L3- hills    Biceps curl 8# 2 x10    Triceps extension precor 15#  2x10    Precor-  IR 10# 2x15  ER 10# 2x15    Precor-  Scap retraction+row 10# 2x15  Shoulder ext 10# 2x15    90/90 IR / ER with 10#  3x10  each Progression    Prone Is x 15 3#  Prone Ys x 15 3#  Prone HA  x 15 3#  Row x 15 3#  Snow angels x10  Wall lift offs 2x10 w RTB  ER in to RTB - steering wheels x 20 NEW  Body blade - Horizonatal, Vertical A/P and M/L 30 secs each - continuous motion.  Ther EX / NMR  Subj assessment    UBE  3 mins FW / BW level 3  6 mins total   Er swiss ball roll ups x 20   Banded t pulls w Mathews TB x 20  NEW  Banded y pulls x 20 w Grey TB  NEW  Pillow case ER vertical wall     Precor-  Scap retraction+row 10# 2x15  Shoulder ext 15# 2x15 - Progression  90/90 IR / ER with 10#  3x10  each   Body blade - Horizonatal, Vertical A/P and M/L 30 secs each     Prone Is x 15 3#  Prone Ys x 15 3#  Prone HA  x 15 3#  Row x 15 3#  Snow angels x10  ER in to RTB - steering wheels x 20      Ther EX / NMR  Subj assessment    Progress note    Sci Fit  3 mins FW / BW level 3  6 mins total     Banded t pulls w Mathews TB x 20    Precor-  IR 10# 2x15  ER 10# 2x15    Biceps curl 8# 2 x10  Triceps extension precor 20#  2x10-progression  Precor-  Scap retraction+row 20# 2x15-progression  Shoulder ext 20# 2x15 - Progression  90/90 IR / ER grey tband x20 ea  Body blade - Horizonatal, Vertical A/P and M/L 30 secs each   Prone Is x 15 3#  Prone Ys x 15 3#  Prone HA  x 15 3#  Row x 15 3#  Snow angels x10    Wall push ups x10  Wall lift offs x 15 w RTB  Side wall walking RTB x1'  Manual and verbal cueing throughout for correct body mechanics   Ther EX / NMR  Subj assessment  Sci Fit  3 mins FW / BW level 3  6 mins total   Precor-  IR 10# 2x15  ER 10#  2x15  Scap retraction+row 20#  2 x 10  Triceps extension precor 20#  2x10  90/90 IR / ER 10# x20 ea  Body blade - Horizonatal, Vertical A/P and M/L 30 secs each   RTB steering wheels x 10 B   Theraband given for ongoing HEP               MT    Clavicle jt mobes BW rotation  -min MT    STM R cervical paraspinals, upper trap, levator scapula x MT  NT               HEP: scap retraction x10, pendulum x10-for pain, c-spine SB x3 20 sec, cervical rotation x10-w and wo towel, cane-flex x10, flexion up wall x10, abduction sitting w towel x10 BID. Theraband given for HEP.   Post QuickDASH Outcome Score  Post Score: 0 % (4/23/2024  3:46 PM)    Patient was advised of these findings, precautions, and treatment options and has agreed to this course of care.    Thank you for your referral. If you have any questions, please contact me at Dept: [phone].    Sincerely,  Electronically signed by therapist: Shahla Baltazar PTA     Physician's certification required:  No    Charges: Morgan x 3   Total Timed Treatment: 45 min  Total Treatment Time: 45 min

## 2024-05-01 ENCOUNTER — OFFICE VISIT (OUTPATIENT)
Dept: ORTHOPEDICS CLINIC | Facility: CLINIC | Age: 39
End: 2024-05-01
Payer: OTHER MISCELLANEOUS

## 2024-05-01 DIAGNOSIS — M19.019 AC JOINT ARTHROPATHY: Primary | ICD-10-CM

## 2024-05-01 PROCEDURE — 99213 OFFICE O/P EST LOW 20 MIN: CPT | Performed by: PHYSICIAN ASSISTANT

## 2024-05-01 NOTE — PROGRESS NOTES
[facility] - ORTHOPEDICS  [address] [place] [id number]  [phone]       Name: Jluis King   MRN: [id number]  Date: 5/1/2024     REASON FOR VISIT: Follow up for right AC joint pre-existing arthropathy and shoulder contusion sustained 12/17/23.    INTERVAL HISTORY:  Jluis King is a 38 year old male who returns for evaluation of right AC joint pre-existing arthropathy and shoulder contusion sustained 12/17/23.    To summarize, right AC joint pre-existing arthropathy and shoulder contusion sustained 12/17/23. He has been in physical therapy, and Meloxicam 15mg. Previously saw Dr. Alanis, and has noted improvement. He has been working light duty. 1/10 pain.      At his last visit he continued to have limitations, and we recommended a second opinion for evaluation.   Today he notes 0/10 pain and 100% of normal function.       ROS: ROS    PE:   There were no vitals filed for this visit.  Estimated body mass index is 29.03 kg/m² as calculated from the following:    Height as of 2/5/24: 6' 1\" (1.854 m).    Weight as of 2/5/24: 220 lb (99.8 kg).    Physical Exam  Constitutional:       Appearance: Normal appearance.   HENT:      Head: Normocephalic and atraumatic.   Eyes:      Extraocular Movements: Extraocular movements intact.   Neck:      Musculoskeletal: Normal range of motion and neck supple.   Cardiovascular:      Pulses: Normal pulses.   Pulmonary:      Effort: Pulmonary effort is normal. No respiratory distress.   Abdominal:      General: There is no distension.   Skin:     General: Skin is warm.      Capillary Refill: Capillary refill takes less than 2 seconds.      Findings: No bruising.   Neurological:      General: No focal deficit present.      Mental Status: She is alert.   Psychiatric:         Mood and Affect: Mood normal.       Examination of the right shoulder demonstrates:     Skin is intact, warm and dry.   Cervical:  Full ROM  Spurling's  Negative    Deformity:    none  Atrophy:   none    Scapular winging: Negative    Palpation:     AC Joint  Negative  Biceps Tendon  Negative  Greater Tuberosity Negative    ROM:   Forward Flexion:  full and symmetric  Abduction:   full and symmetric  External Rotation:  full and symmetric  Internal Rotation:  full and symmetric    Rotator Cuff Strength:   Supraspinatus:   5/5  Subscapularis:   5/5  Infraspinatus/Teres: 5/5    Provocative Tests:   Lucero:   Negative  Speed's:   Negative  Jackson's:   Negative  Lift-off:   Negative  Apprehension:  Negative  Sulcus Sign:   Negative    Neurovascular Upper Extremity (Bilateral)  Motor:    5/5 EPL, Finger Abduction, , Pinch, Deltoid  Sensation:   intact to light touch median, ulnar, radial and axillary nerve  Circulation:   Normal, 2+ radial pulse    The contralateral upper extremity is without limitation in range of motion or strength, no positive provocative maneuvers.       IMPRESSION: Jluis King is a 38 year old male who presented for follow up of  right AC joint pre-existing arthropathy and shoulder contusion sustained 12/17/23.    PLAN:   We had a detailed discussion outlining the etiology, anatomy, pathophysiology, and natural history of the patient's findings.    The patient notes near complete resolution of symptoms, and return to full baseline function. The patient can follow up with our office as needed. The patient had the opportunity to ask questions, and all questions were answered appropriately.       FOLLOW-UP:  Return to clinic on an as needed basis.             William Kuhn [facility], PA-C Orthopedic Surgery / Sports Medicine Specialist  [facility]  [address] [place] [address] [id number] [place] [email or web address]  [email or web address]  t: [phone]  o: [phone]  f: [phone]    This note was dictated using Dragon software.  While it was briefly proofread prior to completion, some grammatical, spelling, and word choice errors due to dictation may  still occur.

## 2024-05-02 ENCOUNTER — APPOINTMENT (OUTPATIENT)
Dept: PHYSICAL THERAPY | Facility: HOSPITAL | Age: 39
End: 2024-05-02
Payer: OTHER MISCELLANEOUS

## 2024-05-06 RX ORDER — MELOXICAM 15 MG/1
15 TABLET ORAL DAILY
Qty: 90 TABLET | Refills: 0 | Status: SHIPPED | OUTPATIENT
Start: 2024-05-06

## 2024-05-06 NOTE — TELEPHONE ENCOUNTER
Meloxicam    DOS: n/a  Last OV: 5/1/24  Last refill date: 2/19/24 #/refills: 14/0  Upcoming appt: No future appointments.      Component      Latest Ref Rng 3/30/2023   Glucose      70 - 99 mg/dL 96    BUN      7 - 18 mg/dL 21 (H)    CREATININE      0.70 - 1.30 mg/dL 0.73    EGFR      >=60 mL/min/1.73m2 119    CALCIUM      8.5 - 10.1 mg/dL 9.4    ALKALINE PHOSPHATASE      45 - 117 U/L 114    AST (SGOT)      15 - 37 U/L 22    ALT (SGPT)      16 - 61 U/L 46    Total Bilirubin      0.1 - 2.0 mg/dL 0.7    PROTEIN, TOTAL      6.4 - 8.2 g/dL 7.5    Albumin      3.4 - 5.0 g/dL 4.3    Magnesium, Serum      1.6 - 2.6 mg/dL 2.2    Sodium      136 - 145 mmol/L 142    Potassium      3.5 - 5.1 mmol/L 3.8    Chloride      98 - 112 mmol/L 111    Carbon Dioxide, Total      21.0 - 32.0 mmol/L 28.0    PHOSPHORUS      2.5 - 4.9 mg/dL 2.7    GAMMA GLUTAMYL TRANSFERASE      15 - 85 U/L 37    LDH      87 - 241 U/L 164    URIC ACID      3.5 - 7.2 mg/dL 4.6    Iron, Serum      65 - 175 ug/dL 94    Cholesterol, Total      <200 mg/dL 192    Triglycerides      30 - 149 mg/dL 41    HDL Cholesterol      40 - 59 mg/dL 51    LDL Cholesterol Calc      <100 mg/dL 133 (H)    VLDL      0 - 30 mg/dL 7    NON-HDL CHOLESTEROL      <130 mg/dL 141 (H)       Legend:  (H) High

## 2024-08-05 RX ORDER — MELOXICAM 15 MG/1
15 TABLET ORAL DAILY
Qty: 90 TABLET | Refills: 0 | Status: SHIPPED | OUTPATIENT
Start: 2024-08-05

## 2024-08-05 NOTE — TELEPHONE ENCOUNTER
Meloxicam    DOS: n/a  Last OV: 5/1/24  Last refill date: 5/6/24 #/refills: 90/0  Upcoming appt: No future appointments.      Component      Latest Ref Rng 3/30/2023   Glucose      70 - 99 mg/dL 96    BUN      7 - 18 mg/dL 21 (H)    CREATININE      0.70 - 1.30 mg/dL 0.73    EGFR      >=60 mL/min/1.73m2 119    CALCIUM      8.5 - 10.1 mg/dL 9.4    ALKALINE PHOSPHATASE      45 - 117 U/L 114    AST (SGOT)      15 - 37 U/L 22    ALT (SGPT)      16 - 61 U/L 46    Total Bilirubin      0.1 - 2.0 mg/dL 0.7    PROTEIN, TOTAL      6.4 - 8.2 g/dL 7.5    Albumin      3.4 - 5.0 g/dL 4.3    Magnesium, Serum      1.6 - 2.6 mg/dL 2.2    Sodium      136 - 145 mmol/L 142    Potassium      3.5 - 5.1 mmol/L 3.8    Chloride      98 - 112 mmol/L 111    Carbon Dioxide, Total      21.0 - 32.0 mmol/L 28.0    PHOSPHORUS      2.5 - 4.9 mg/dL 2.7    GAMMA GLUTAMYL TRANSFERASE      15 - 85 U/L 37    LDH      87 - 241 U/L 164    URIC ACID      3.5 - 7.2 mg/dL 4.6    Iron, Serum      65 - 175 ug/dL 94    Cholesterol, Total      <200 mg/dL 192    Triglycerides      30 - 149 mg/dL 41    HDL Cholesterol      40 - 59 mg/dL 51    LDL Cholesterol Calc      <100 mg/dL 133 (H)    VLDL      0 - 30 mg/dL 7    NON-HDL CHOLESTEROL      <130 mg/dL 141 (H)       Legend:  (H) High

## 2025-02-23 NOTE — LETTER
Date & Time: 2/17/2021, 9:21 AM  Patient: Kristi Troy  Encounter Provider(s):    Shirlene Alcazar MD       To Whom It May Concern:    Tatum Dominguez was seen and treated in our department on 2/17/2021.  He should not return to work until cleared by recurrent variable decels

## (undated) NOTE — LETTER
Patient Name: Cosme Kapoor  YOB: 1985          MRN number:  [id number]  Date:  9/12/2018  Referring Physician:  No ref. provider found     Discharge Summary    Pt has attended 5 visits in Physical Therapy.    Subjective: feeling Ok, No R L feels approximately 80% better since the day of the onset of his pain. 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     Pt goals include prevent Special tests:  Supine SLR is 45 degrees each leg, but no pain. -FABERs and FADIRs tests. 7\" LSU x 5 each leg; low back pain reported with right step-ups, no pain with left step-ups.       Balance: SLS R 10 seconds, wobbly with low back pain noted, L 2 Sincerely,  Electronically signed by therapist: Asia Rodgers

## (undated) NOTE — LETTER
Date: 3/18/2024    Patient Name: Jluis King          To Whom it may concern:    This letter has been written at the patient's request. The above patient was seen at [facility] for treatment of a medical condition.    He continues to have limitations despite conservative treatment. He expresses worry regarding return to full duty work.     He can return to work no pulling, pushing, or lifting greater than 2lbs.  I recommend second opinion or SHREE  revaluation for additional PT/ or full duty release.     Follow up with as needed.       Sincerely,          Sincer MICHAEL Kuhn [facility], PA-C Orthopedic Surgery / Sports Medicine Specialist  [facility]  [address] [place] [address].org  [email or web address]  t: [phone]  o: [phone]  f: [phone]    This note was dictated using Dragon software.  While it was briefly proofread prior to completion, some grammatical, spelling, and word choice errors due to dictation may still occur.

## (undated) NOTE — LETTER
Patient Name: Lia Pederson  YOB: 1985          MRN number:  [id number]  Date:  8/15/2018  Referring Physician:  Ferhco Horn          SPINE EVALUATION:    Referring Physician: Dr. Ajit Ashton  Diagnosis: Lumbar pain strain. Sha Cabrera would benefit from skilled Physical Therapy to address the above impairments to decrease pain and allow improvement with above noted functional problems.      Precautions:  None  OBJECTIVE:   Observation/Posture: tends to hyperextend his knees when s Charges: PT Eval Moderate Complexity, TherEx1. Total Timed Treatment: 20 min     Total Treatment Time: 45 min     PLAN OF CARE:    Goals: To be achieved over 6 PT sessions:    Sit to/from standing without UE assist and without pain.   Pt will be able

## (undated) NOTE — LETTER
Patient Name: Pavan Knox  YOB: 1985          MRN number:  [id number]  Date:  8/31/2018  Referring Physician:  Janine Bence     Progress Summary    Pt has attended 5, cancelled 0, and no shown 0 visits in Physical Therapy. FOTO (initial evaluation): 56/100  FOTO (re-evaluation): 61/100    Goals: To be achieved over 6 PT sessions:    Sit to/from standing without UE assist and without pain. MET  Pt will be able to walk at least 4 blocks without back pain.   PROGRESSING  Pt will

## (undated) NOTE — LETTER
Date: 2/5/2024    Patient Name: Jluis King          To Whom it may concern:    This letter has been written at the patient's request. The above patient was seen at the [facility] for treatment of a medical condition.    Unable to use the right upper extremity due to injury. Should not drive, light duty only.  Re-evaluate in 6 weeks.       Sincerely,        Angela Alanis MD  Knee, Shoulder, & Elbow Surgery / Sports Medicine Specialist  Orthopaedic Surgery  [address] [place] [address].[email or web address]  [email or web address]  t: [phone]  o: [phone]  f: [phone]

## (undated) NOTE — LETTER
Date: 5/1/2024    Patient Name: Jluis King          To Whom it may concern:    This letter has been written at the patient's request. The above patient was seen at [facility] for treatment of a medical condition.    The patient can return to work without restrictions. He is MMI.     Sincerely,          Sincer MOISES Davis, PAPingC Orthopedic Surgery / Sports Medicine Specialist  [facility]  [address] [place] [address].[facility]  [email or web address]  t: [phone]  o: [phone]  f: [phone]    This note was dictated using Dragon software.  While it was briefly proofread prior to completion, some grammatical, spelling, and word choice errors due to dictation may still occur.